# Patient Record
Sex: FEMALE | NOT HISPANIC OR LATINO | URBAN - METROPOLITAN AREA
[De-identification: names, ages, dates, MRNs, and addresses within clinical notes are randomized per-mention and may not be internally consistent; named-entity substitution may affect disease eponyms.]

---

## 2024-04-30 ENCOUNTER — LAB (OUTPATIENT)
Dept: LAB | Facility: LAB | Age: 24
End: 2024-04-30
Payer: COMMERCIAL

## 2024-04-30 ENCOUNTER — OFFICE VISIT (OUTPATIENT)
Dept: OBSTETRICS AND GYNECOLOGY | Facility: CLINIC | Age: 24
End: 2024-04-30
Payer: COMMERCIAL

## 2024-04-30 VITALS
HEART RATE: 98 BPM | WEIGHT: 121 LBS | DIASTOLIC BLOOD PRESSURE: 79 MMHG | BODY MASS INDEX: 23.63 KG/M2 | SYSTOLIC BLOOD PRESSURE: 111 MMHG

## 2024-04-30 DIAGNOSIS — J30.2 SEASONAL ALLERGIES: ICD-10-CM

## 2024-04-30 DIAGNOSIS — J45.909 ASTHMA, UNSPECIFIED ASTHMA SEVERITY, UNSPECIFIED WHETHER COMPLICATED, UNSPECIFIED WHETHER PERSISTENT (HHS-HCC): ICD-10-CM

## 2024-04-30 DIAGNOSIS — Z71.6 ENCOUNTER FOR SMOKING CESSATION COUNSELING: ICD-10-CM

## 2024-04-30 DIAGNOSIS — Z11.3 ROUTINE SCREENING FOR STI (SEXUALLY TRANSMITTED INFECTION): ICD-10-CM

## 2024-04-30 DIAGNOSIS — Z30.09 ENCOUNTER FOR COUNSELING REGARDING CONTRACEPTION: ICD-10-CM

## 2024-04-30 DIAGNOSIS — Z11.3 ROUTINE SCREENING FOR STI (SEXUALLY TRANSMITTED INFECTION): Primary | ICD-10-CM

## 2024-04-30 PROBLEM — R87.612 LOW GRADE SQUAMOUS INTRAEPITHELIAL LESION ON CYTOLOGIC SMEAR OF CERVIX (LGSIL): Status: ACTIVE | Noted: 2024-04-30

## 2024-04-30 LAB
HBV SURFACE AG SERPL QL IA: NONREACTIVE
HCV AB SER QL: NONREACTIVE
HIV 1+2 AB+HIV1 P24 AG SERPL QL IA: NONREACTIVE
TREPONEMA PALLIDUM IGG+IGM AB [PRESENCE] IN SERUM OR PLASMA BY IMMUNOASSAY: NONREACTIVE

## 2024-04-30 PROCEDURE — 99406 BEHAV CHNG SMOKING 3-10 MIN: CPT

## 2024-04-30 PROCEDURE — 86780 TREPONEMA PALLIDUM: CPT

## 2024-04-30 PROCEDURE — 87661 TRICHOMONAS VAGINALIS AMPLIF: CPT

## 2024-04-30 PROCEDURE — 87389 HIV-1 AG W/HIV-1&-2 AB AG IA: CPT

## 2024-04-30 PROCEDURE — 36415 COLL VENOUS BLD VENIPUNCTURE: CPT

## 2024-04-30 PROCEDURE — 4004F PT TOBACCO SCREEN RCVD TLK: CPT

## 2024-04-30 PROCEDURE — 87340 HEPATITIS B SURFACE AG IA: CPT

## 2024-04-30 PROCEDURE — 99213 OFFICE O/P EST LOW 20 MIN: CPT

## 2024-04-30 PROCEDURE — 99406 BEHAV CHNG SMOKING 3-10 MIN: CPT | Mod: GC

## 2024-04-30 PROCEDURE — 99215 OFFICE O/P EST HI 40 MIN: CPT | Mod: GC

## 2024-04-30 PROCEDURE — 87800 DETECT AGNT MULT DNA DIREC: CPT

## 2024-04-30 PROCEDURE — 86803 HEPATITIS C AB TEST: CPT

## 2024-04-30 NOTE — PROGRESS NOTES
Oswaldo Rene is a 23 y.o.  here for visit.    HPI:   Presenting for exam today. No concerns today. Wants Mirena IUD and needs pap, but doesn't have time today. Will schedule procedure only visit next week for both. Wants STI testing today, no concerns.     ObHx:   GynHx:  Menarche/Menopause: 9  Patient's last menstrual period was 2024 (exact date).   Periods are regular every 28-30 days, lasting 5 days.  Current contraception: None, desires Mirena IUD to be placed at next visit.  STIs: none  Last pap 2021, LSIL, due today. Deferred to next visit due to pt child being present.  Social History: uses black n milds and vape, not yet ready to quit. Has quit date as birthday 10/2025.   Exercise: active  Past med hx and past surg hx reviewed and notable for: asthma.    Objective   /79   Pulse 98   Wt 54.9 kg (121 lb)   LMP 2024 (Exact Date)   BMI 23.63 kg/m²      General:   Alert and oriented, in no acute distress   Neck: Supple. No visible thyromegaly.    Breast/Axilla:    Abdomen: Soft, non-tender, without masses or organomegaly   Vulva:    Vagina:    Cervix:    Uterus:    Adnexa:    Pelvic Floor    Psych Normal affect. Normal mood.      Assessment and Plan:    23 y.o. presenting to establish care.  Discussed smoking cessation, STI screening, contraception counseling today.  Patient wants to quit before birthday 10/2025. She declines replacement therapy today she will ween down how much she is using her vape and B&M  Discussed options for contraception- she decided on a Mirena IUD and pap smear when child not present. Will schedule appointment for next week. Will abstain from intercourse until then.  Routine STI screening ordered today.  Needs referral for PCP for refill of epipen/albuterol    Orders Placed This Encounter   Procedures    C. trachomatis + N. gonorrhoeae, Amplified     Standing Status:   Future     Standing Expiration Date:   2025     Order Specific Question:    Release result to MyChart     Answer:   Immediate    Trichomonas vaginalis, Amplified     Standing Status:   Future     Standing Expiration Date:   4/30/2025     Order Specific Question:   Release result to MyChart     Answer:   Immediate    HIV 1/2 Antigen/Antibody Screen with Reflex to Confirmation     Standing Status:   Future     Standing Expiration Date:   4/30/2025     Order Specific Question:   Release result to MyChart     Answer:   Immediate [1]    Hepatitis C Antibody     Standing Status:   Future     Standing Expiration Date:   4/30/2025     Order Specific Question:   Release result to MyChart     Answer:   Immediate [1]    Hepatitis B Surface Antigen     Standing Status:   Future     Standing Expiration Date:   4/30/2025     Order Specific Question:   Release result to MyChart     Answer:   Immediate [1]    Syphilis Screen with Reflex     Standing Status:   Future     Standing Expiration Date:   4/30/2025     Order Specific Question:   Release result to MyChart     Answer:   Immediate [1]    Referral to Primary Care     Standing Status:   Future     Standing Expiration Date:   10/30/2024     Referral Priority:   Routine     Referral Type:   Consultation     Referral Reason:   Specialty Services Required     Requested Specialty:   Family Medicine     Number of Visits Requested:   1      Seen and discussed with Dr. Bailey.   Ramona Stinson MD PGY-1 OB/GYN

## 2024-04-30 NOTE — PATIENT INSTRUCTIONS
Please establish care with Primary care to manage your inhaler and epi pen.  You can buy Zyrtec over the counter for allergies.   See us next week for IUD and pap smear.

## 2024-05-01 LAB
C TRACH RRNA SPEC QL NAA+PROBE: NEGATIVE
N GONORRHOEA DNA SPEC QL PROBE+SIG AMP: NEGATIVE
T VAGINALIS RRNA SPEC QL NAA+PROBE: NEGATIVE

## 2024-10-31 ENCOUNTER — LAB (OUTPATIENT)
Dept: LAB | Facility: LAB | Age: 24
End: 2024-10-31
Payer: COMMERCIAL

## 2024-10-31 ENCOUNTER — INITIAL PRENATAL (OUTPATIENT)
Dept: OBSTETRICS AND GYNECOLOGY | Facility: CLINIC | Age: 24
End: 2024-10-31
Payer: COMMERCIAL

## 2024-10-31 VITALS
WEIGHT: 124.4 LBS | HEART RATE: 65 BPM | HEIGHT: 62 IN | SYSTOLIC BLOOD PRESSURE: 119 MMHG | BODY MASS INDEX: 22.89 KG/M2 | DIASTOLIC BLOOD PRESSURE: 75 MMHG

## 2024-10-31 DIAGNOSIS — J45.909 ASTHMA, UNSPECIFIED ASTHMA SEVERITY, UNSPECIFIED WHETHER COMPLICATED, UNSPECIFIED WHETHER PERSISTENT (HHS-HCC): ICD-10-CM

## 2024-10-31 DIAGNOSIS — Z34.90: ICD-10-CM

## 2024-10-31 DIAGNOSIS — Z87.59 HISTORY OF PRETERM PREMATURE RUPTURE OF MEMBRANES (PPROM): ICD-10-CM

## 2024-10-31 DIAGNOSIS — Z34.81 ENCOUNTER FOR SUPERVISION OF OTHER NORMAL PREGNANCY, FIRST TRIMESTER: ICD-10-CM

## 2024-10-31 DIAGNOSIS — Z32.01 PREGNANCY TEST POSITIVE (HHS-HCC): Primary | ICD-10-CM

## 2024-10-31 DIAGNOSIS — O21.9 NAUSEA AND VOMITING IN PREGNANCY PRIOR TO 22 WEEKS GESTATION: ICD-10-CM

## 2024-10-31 DIAGNOSIS — Z3A.08 8 WEEKS GESTATION OF PREGNANCY (HHS-HCC): ICD-10-CM

## 2024-10-31 PROBLEM — Z30.09 ENCOUNTER FOR COUNSELING REGARDING CONTRACEPTION: Status: RESOLVED | Noted: 2024-04-30 | Resolved: 2024-10-31

## 2024-10-31 PROBLEM — Z71.6 ENCOUNTER FOR SMOKING CESSATION COUNSELING: Status: RESOLVED | Noted: 2024-04-30 | Resolved: 2024-10-31

## 2024-10-31 PROBLEM — Z11.3 ROUTINE SCREENING FOR STI (SEXUALLY TRANSMITTED INFECTION): Status: RESOLVED | Noted: 2024-04-30 | Resolved: 2024-10-31

## 2024-10-31 LAB
ABO GROUP (TYPE) IN BLOOD: NORMAL
ANTIBODY SCREEN: NORMAL
ERYTHROCYTE [DISTWIDTH] IN BLOOD BY AUTOMATED COUNT: 11.9 % (ref 11.5–14.5)
EST. AVERAGE GLUCOSE BLD GHB EST-MCNC: 85 MG/DL
HBA1C MFR BLD: 4.6 %
HBV SURFACE AG SERPL QL IA: NONREACTIVE
HCT VFR BLD AUTO: 40 % (ref 36–46)
HCV AB SER QL: NONREACTIVE
HGB BLD-MCNC: 13.8 G/DL (ref 12–16)
HIV 1+2 AB+HIV1 P24 AG SERPL QL IA: NONREACTIVE
MCH RBC QN AUTO: 31.6 PG (ref 26–34)
MCHC RBC AUTO-ENTMCNC: 34.5 G/DL (ref 32–36)
MCV RBC AUTO: 92 FL (ref 80–100)
NRBC BLD-RTO: 0 /100 WBCS (ref 0–0)
PLATELET # BLD AUTO: 310 X10*3/UL (ref 150–450)
PREGNANCY TEST URINE, POC: POSITIVE
RBC # BLD AUTO: 4.37 X10*6/UL (ref 4–5.2)
REFLEX ADDED, ANEMIA PANEL: NORMAL
RH FACTOR (ANTIGEN D): NORMAL
RUBV IGG SERPL IA-ACNC: 1 IA
RUBV IGG SERPL QL IA: POSITIVE
TREPONEMA PALLIDUM IGG+IGM AB [PRESENCE] IN SERUM OR PLASMA BY IMMUNOASSAY: NONREACTIVE
WBC # BLD AUTO: 11.6 X10*3/UL (ref 4.4–11.3)

## 2024-10-31 PROCEDURE — 85027 COMPLETE CBC AUTOMATED: CPT

## 2024-10-31 PROCEDURE — 83020 HEMOGLOBIN ELECTROPHORESIS: CPT | Performed by: ADVANCED PRACTICE MIDWIFE

## 2024-10-31 PROCEDURE — 86901 BLOOD TYPING SEROLOGIC RH(D): CPT

## 2024-10-31 PROCEDURE — 87389 HIV-1 AG W/HIV-1&-2 AB AG IA: CPT

## 2024-10-31 PROCEDURE — 86317 IMMUNOASSAY INFECTIOUS AGENT: CPT

## 2024-10-31 PROCEDURE — 83036 HEMOGLOBIN GLYCOSYLATED A1C: CPT

## 2024-10-31 PROCEDURE — 87340 HEPATITIS B SURFACE AG IA: CPT

## 2024-10-31 PROCEDURE — 36415 COLL VENOUS BLD VENIPUNCTURE: CPT

## 2024-10-31 PROCEDURE — 87086 URINE CULTURE/COLONY COUNT: CPT | Performed by: ADVANCED PRACTICE MIDWIFE

## 2024-10-31 PROCEDURE — 83021 HEMOGLOBIN CHROMOTOGRAPHY: CPT

## 2024-10-31 PROCEDURE — 87661 TRICHOMONAS VAGINALIS AMPLIF: CPT | Performed by: ADVANCED PRACTICE MIDWIFE

## 2024-10-31 PROCEDURE — 87491 CHLMYD TRACH DNA AMP PROBE: CPT | Performed by: ADVANCED PRACTICE MIDWIFE

## 2024-10-31 PROCEDURE — 81025 URINE PREGNANCY TEST: CPT | Performed by: ADVANCED PRACTICE MIDWIFE

## 2024-10-31 PROCEDURE — 86900 BLOOD TYPING SEROLOGIC ABO: CPT

## 2024-10-31 PROCEDURE — 99215 OFFICE O/P EST HI 40 MIN: CPT | Performed by: ADVANCED PRACTICE MIDWIFE

## 2024-10-31 PROCEDURE — 86780 TREPONEMA PALLIDUM: CPT

## 2024-10-31 PROCEDURE — 86803 HEPATITIS C AB TEST: CPT

## 2024-10-31 PROCEDURE — 86850 RBC ANTIBODY SCREEN: CPT

## 2024-10-31 RX ORDER — DOXYLAMINE SUCCINATE AND PYRIDOXINE HYDROCHLORIDE, DELAYED RELEASE TABLETS 10 MG/10 MG 10; 10 MG/1; MG/1
2 TABLET, DELAYED RELEASE ORAL NIGHTLY
Qty: 60 TABLET | Refills: 1 | Status: SHIPPED | OUTPATIENT
Start: 2024-10-31

## 2024-10-31 SDOH — ECONOMIC STABILITY: FOOD INSECURITY: WITHIN THE PAST 12 MONTHS, THE FOOD YOU BOUGHT JUST DIDN'T LAST AND YOU DIDN'T HAVE MONEY TO GET MORE.: NEVER TRUE

## 2024-10-31 SDOH — ECONOMIC STABILITY: FOOD INSECURITY: WITHIN THE PAST 12 MONTHS, YOU WORRIED THAT YOUR FOOD WOULD RUN OUT BEFORE YOU GOT THE MONEY TO BUY MORE.: NEVER TRUE

## 2024-10-31 SDOH — HEALTH STABILITY: MENTAL HEALTH
DO YOU FEEL STRESS - TENSE, RESTLESS, NERVOUS, OR ANXIOUS, OR UNABLE TO SLEEP AT NIGHT BECAUSE YOUR MIND IS TROUBLED ALL THE TIME - THESE DAYS?: NOT AT ALL

## 2024-10-31 SDOH — ECONOMIC STABILITY: TRANSPORTATION INSECURITY: IN THE PAST 12 MONTHS, HAS LACK OF TRANSPORTATION KEPT YOU FROM MEDICAL APPOINTMENTS OR FROM GETTING MEDICATIONS?: NO

## 2024-10-31 ASSESSMENT — PAIN - FUNCTIONAL ASSESSMENT: PAIN_FUNCTIONAL_ASSESSMENT: 0-10

## 2024-10-31 ASSESSMENT — EDINBURGH POSTNATAL DEPRESSION SCALE (EPDS)
TOTAL SCORE: 6
I HAVE FELT SCARED OR PANICKY FOR NO GOOD REASON: NO, NOT MUCH
I HAVE BEEN ABLE TO LAUGH AND SEE THE FUNNY SIDE OF THINGS: AS MUCH AS I ALWAYS COULD
THINGS HAVE BEEN GETTING ON TOP OF ME: YES, MOST OF THE TIME I HAVEN'T BEEN ABLE TO COPE AT ALL
I HAVE BLAMED MYSELF UNNECESSARILY WHEN THINGS WENT WRONG: NOT VERY OFTEN
I HAVE BEEN SO UNHAPPY THAT I HAVE HAD DIFFICULTY SLEEPING: NOT AT ALL
I HAVE BEEN ANXIOUS OR WORRIED FOR NO GOOD REASON: HARDLY EVER
I HAVE LOOKED FORWARD WITH ENJOYMENT TO THINGS: AS MUCH AS I EVER DID
THE THOUGHT OF HARMING MYSELF HAS OCCURRED TO ME: NEVER
I HAVE FELT SAD OR MISERABLE: NO, NOT AT ALL
I HAVE BEEN SO UNHAPPY THAT I HAVE BEEN CRYING: NO, NEVER

## 2024-11-01 LAB — BACTERIA UR CULT: NO GROWTH

## 2024-11-04 LAB
HEMOGLOBIN A2: 2.4 % (ref 2–3.5)
HEMOGLOBIN A: 97.3 % (ref 95.8–98)
HEMOGLOBIN F: 0.3 % (ref 0–2)
HEMOGLOBIN IDENTIFICATION INTERPRETATION: NORMAL
PATH REVIEW-HGB IDENTIFICATION: NORMAL

## 2024-11-11 ENCOUNTER — HOSPITAL ENCOUNTER (OUTPATIENT)
Dept: RADIOLOGY | Facility: CLINIC | Age: 24
Discharge: HOME | End: 2024-11-11
Payer: COMMERCIAL

## 2024-11-11 DIAGNOSIS — Z34.90: ICD-10-CM

## 2024-11-11 PROCEDURE — 76801 OB US < 14 WKS SINGLE FETUS: CPT

## 2024-11-11 PROCEDURE — 76801 OB US < 14 WKS SINGLE FETUS: CPT | Performed by: OBSTETRICS & GYNECOLOGY

## 2024-11-13 ENCOUNTER — APPOINTMENT (OUTPATIENT)
Dept: RADIOLOGY | Facility: CLINIC | Age: 24
End: 2024-11-13
Payer: COMMERCIAL

## 2024-11-14 LAB
CYTOLOGY CMNT CVX/VAG CYTO-IMP: NORMAL
LAB AP HPV GENOTYPE QUESTION: NO
LAB AP HPV HR: NORMAL
LAB AP PAP ADDITIONAL TESTS: NORMAL
LAB AP PREVIOUS ABNORMAL HISTORY: NORMAL
LABORATORY COMMENT REPORT: NORMAL
LMP START DATE: NORMAL
MENSTRUAL HX REPORTED: NORMAL
PATH REPORT.TOTAL CANCER: NORMAL

## 2024-11-20 PROBLEM — Z3A.11 11 WEEKS GESTATION OF PREGNANCY (HHS-HCC): Status: ACTIVE | Noted: 2024-10-31

## 2024-11-21 ENCOUNTER — INITIAL PRENATAL (OUTPATIENT)
Dept: MATERNAL FETAL MEDICINE | Facility: CLINIC | Age: 24
End: 2024-11-21
Payer: COMMERCIAL

## 2024-11-21 VITALS
HEART RATE: 87 BPM | DIASTOLIC BLOOD PRESSURE: 62 MMHG | BODY MASS INDEX: 24.39 KG/M2 | SYSTOLIC BLOOD PRESSURE: 101 MMHG | WEIGHT: 131.2 LBS

## 2024-11-21 DIAGNOSIS — O21.9 NAUSEA AND VOMITING IN PREGNANCY PRIOR TO 22 WEEKS GESTATION: ICD-10-CM

## 2024-11-21 DIAGNOSIS — Z87.59 HISTORY OF THIRD DEGREE PERINEAL LACERATION: ICD-10-CM

## 2024-11-21 DIAGNOSIS — O99.511 ASTHMA AFFECTING PREGNANCY IN FIRST TRIMESTER (HHS-HCC): ICD-10-CM

## 2024-11-21 DIAGNOSIS — J45.909 ASTHMA AFFECTING PREGNANCY IN FIRST TRIMESTER (HHS-HCC): ICD-10-CM

## 2024-11-21 DIAGNOSIS — Z3A.11 11 WEEKS GESTATION OF PREGNANCY (HHS-HCC): ICD-10-CM

## 2024-11-21 DIAGNOSIS — Z87.59 HISTORY OF PRETERM PREMATURE RUPTURE OF MEMBRANES (PPROM): Primary | ICD-10-CM

## 2024-11-21 PROCEDURE — 99214 OFFICE O/P EST MOD 30 MIN: CPT | Mod: GC | Performed by: OBSTETRICS & GYNECOLOGY

## 2024-11-21 PROCEDURE — 99244 OFF/OP CNSLTJ NEW/EST MOD 40: CPT | Performed by: OBSTETRICS & GYNECOLOGY

## 2024-11-21 ASSESSMENT — ENCOUNTER SYMPTOMS
ALLERGIC/IMMUNOLOGIC NEGATIVE: 0
HEMATOLOGIC/LYMPHATIC NEGATIVE: 0
NEUROLOGICAL NEGATIVE: 0
ENDOCRINE NEGATIVE: 0
CONSTITUTIONAL NEGATIVE: 0
CARDIOVASCULAR NEGATIVE: 0
EYES NEGATIVE: 0
PSYCHIATRIC NEGATIVE: 0
RESPIRATORY NEGATIVE: 0
MUSCULOSKELETAL NEGATIVE: 0
GASTROINTESTINAL NEGATIVE: 0

## 2024-11-21 ASSESSMENT — PATIENT HEALTH QUESTIONNAIRE - PHQ9
SUM OF ALL RESPONSES TO PHQ9 QUESTIONS 1 AND 2: 0
2. FEELING DOWN, DEPRESSED OR HOPELESS: NOT AT ALL
1. LITTLE INTEREST OR PLEASURE IN DOING THINGS: NOT AT ALL

## 2024-11-21 ASSESSMENT — PAIN SCALES - GENERAL: PAINLEVEL_OUTOF10: 0 - NO PAIN

## 2024-11-21 ASSESSMENT — PAIN - FUNCTIONAL ASSESSMENT: PAIN_FUNCTIONAL_ASSESSMENT: 0-10

## 2024-11-21 NOTE — ASSESSMENT & PLAN NOTE
- As you know the patient has a history of pre-term delivery.  Given her report of continuous period-like bleeding throughout her prior pregnancy, we suspect she had an abruption at this time which ultimately led to PPROM and  delivery, however there is no obvious sign of abruption on her prior placental pathology.    - We discussed that often it is unclear the etiology of pre-term delivery which is consistent with her history.  We discussed women with a history of pre-term delivery are at an increased risk of subsequent pre-term delivery of ~25-30%, though there are no good models to predict individual recurrent risk.       - We discussed we would recommend serial cervical length measurement starting at 16-18 weeks to assess for potential cerclage placement and/or vaginal progesterone. Patient is undecided on if she would like to move forward with serial cerivcal lengths. Will order at NT ultrasound, patient encouraged to schedule if she desires to start screening at 16wga.

## 2024-11-21 NOTE — PROGRESS NOTES
"2024   Oswaldo Rene     [facility] CONSULT NOTE  Referring Clinician: Julianne Peters   Reason for consult: PTD     HPI: Oswaldo Rene is a 24 y.o.  at 11w4d here for consult for h/o PTB.     ObHx:  -  39.0 c/b 3b tear. Denies bowel/bladder dysfunction, feels she healed well.   -  delivery at uncertain gestational age, possibly 35wga   - NPNC that pregnancy   - reports she had \"a normal period\" the entire pregnancy with monthly bleeding, subsequently PPROM'd and precipitously delivered   - baby up for adoption    Today she reports nausea this pregnancy, resolves with sleeping. Occasional AM emesis. Able to otherwise tolerate PO.     Patient reports no cramping, bleeding, leaking fluid.     Pregnancy otherwise notable for:  - childhood asthma    Patient Active Problem List   Diagnosis    Asthma affecting pregnancy in first trimester ([facility])    Seasonal allergies    Low grade squamous intraepithelial lesion (LGSIL) on cervicovaginal cytologic smear    11 weeks gestation of pregnancy ([facility])    History of  premature rupture of membranes (PPROM)    Nausea and vomiting in pregnancy prior to 22 weeks gestation     10 point review of system is negative except as above    OB History          3    Para   2    Term   1       1    AB        Living   2         SAB        IAB        Ectopic        Multiple        Live Births   2                   Past medical history: Denies HTN, DM, depression, or thyroid issues    No past surgical history on file.    Medications: PNV, unisom    Allergies   Allergen Reactions    Kiwi Itching and Swelling    Pollen Extracts Itching and Cough       Social History     Tobacco Use    Smoking status: Former     Types: Cigars     Quit date: 2024     Years since quittin.6    Smokeless tobacco: Never   Vaping Use    Vaping status: Never Used   Substance Use Topics    Alcohol use: Not Currently     Alcohol/week: 4.0 standard drinks of alcohol     " Types: 2 Glasses of wine, 2 Standard drinks or equivalent per week    Drug use: Never       family history includes Breast cancer in her paternal grandmother; Cirrhosis in her father; Colon cancer in her maternal grandmother; Diabetes in her mother; Diverticulitis in her maternal grandfather and maternal grandmother; Eczema in her father; Sickle cell trait in her mother.      OBJECTIVE  Visit Vitals  /62   Pulse 87   Wt 59.5 kg (131 lb 3.2 oz)   LMP 2024 (Within Weeks)   BMI 24.39 kg/m²   OB Status Pregnant   Smoking Status Former   BSA 1.61 m²       Physical exam  General: Well appearing, alert  HEENT: normocephalic, EOMI, clear sclera  Cardio: Warm and well perfused  Resp: breathing comfortably on room air  Abd: gravid  Neuro: grossly intact, no focal deficits  Extremities: full ROM, no LE edema  Psych: A&O x3, appropriate mood and affect    ASSESSMENT & PLAN    Oswaldo Rene is a 24 y.o.  at 11w4d here for the following:    Assessment & Plan  History of  premature rupture of membranes (PPROM)  - As you know the patient has a history of pre-term delivery.  Given her report of continuous period-like bleeding throughout her prior pregnancy, we suspect she had an abruption at this time which ultimately led to PPROM and  delivery, however there is no obvious sign of abruption on her prior placental pathology.    - We discussed that often it is unclear the etiology of pre-term delivery which is consistent with her history.  We discussed women with a history of pre-term delivery are at an increased risk of subsequent pre-term delivery of ~25-30%, though there are no good models to predict individual recurrent risk.       - We discussed we would recommend serial cervical length measurement starting at 16-18 weeks to assess for potential cerclage placement and/or vaginal progesterone. Patient is undecided on if she would like to move forward with serial cerivcal lengths. Will order  at NT ultrasound, patient encouraged to schedule if she desires to start screening at 16wga.  Asthma affecting pregnancy in first trimester ([facility])  - discussed asthma can worsen in pregnancy, recommend notifying doctor if she starts experiencing symptoms  11 weeks gestation of pregnancy ([facility])  - NT ultrasound scheduled   Nausea and vomiting in pregnancy prior to 22 weeks gestation  - Able to tolerate PO but still having nasuea/rare emesis with unisom and B6  - rx for zofran sent to pharmacy   History of third degree perineal laceration  - Discussed prior 3b laceration and option for  delivery if she desires. Patient has no concerns about this prior history and strongly desires vaginal delivery   - Reviewed strategies for decreasing risk of perineal laceration prior to and during delivery including warm compresses and perineal massage.    Thank you for allowing us to participate in the care of your patient. We anticipate she should be able to continue her care under your supervision. Please do not hesitate to contact us should any concerns arise.     Patient see and discussed with Dr. Willian Bowser MD , PGY-3  Maternal Fetal Medicine

## 2024-11-21 NOTE — ASSESSMENT & PLAN NOTE
- Discussed prior 3b laceration and option for  delivery if she desires. Patient has no concerns about this prior history and strongly desires vaginal delivery   - Reviewed strategies for decreasing risk of perineal laceration prior to and during delivery including warm compresses and perineal massage.

## 2024-11-21 NOTE — ASSESSMENT & PLAN NOTE
- discussed asthma can worsen in pregnancy, recommend notifying doctor if she starts experiencing symptoms

## 2024-11-21 NOTE — ASSESSMENT & PLAN NOTE
- Able to tolerate PO but still having nasuea/rare emesis with unisom and B6  - rx for zofran sent to pharmacy

## 2024-11-29 ENCOUNTER — ROUTINE PRENATAL (OUTPATIENT)
Dept: OBSTETRICS AND GYNECOLOGY | Facility: CLINIC | Age: 24
End: 2024-11-29
Payer: COMMERCIAL

## 2024-11-29 ENCOUNTER — HOSPITAL ENCOUNTER (OUTPATIENT)
Dept: RADIOLOGY | Facility: CLINIC | Age: 24
Discharge: HOME | End: 2024-11-29
Payer: COMMERCIAL

## 2024-11-29 VITALS — DIASTOLIC BLOOD PRESSURE: 69 MMHG | BODY MASS INDEX: 24.63 KG/M2 | SYSTOLIC BLOOD PRESSURE: 93 MMHG | WEIGHT: 132.5 LBS

## 2024-11-29 DIAGNOSIS — J45.909 ASTHMA AFFECTING PREGNANCY IN FIRST TRIMESTER (HHS-HCC): ICD-10-CM

## 2024-11-29 DIAGNOSIS — Z34.90: ICD-10-CM

## 2024-11-29 DIAGNOSIS — Z87.59 HISTORY OF PRETERM PREMATURE RUPTURE OF MEMBRANES (PPROM): Primary | ICD-10-CM

## 2024-11-29 DIAGNOSIS — O99.511 ASTHMA AFFECTING PREGNANCY IN FIRST TRIMESTER (HHS-HCC): ICD-10-CM

## 2024-11-29 DIAGNOSIS — Z3A.12 12 WEEKS GESTATION OF PREGNANCY (HHS-HCC): ICD-10-CM

## 2024-11-29 PROCEDURE — 99213 OFFICE O/P EST LOW 20 MIN: CPT | Mod: TH | Performed by: OBSTETRICS & GYNECOLOGY

## 2024-11-29 PROCEDURE — 99213 OFFICE O/P EST LOW 20 MIN: CPT | Performed by: OBSTETRICS & GYNECOLOGY

## 2024-11-29 PROCEDURE — 76813 OB US NUCHAL MEAS 1 GEST: CPT

## 2024-11-29 RX ORDER — ASPIRIN 81 MG/1
162 TABLET ORAL DAILY
COMMUNITY

## 2024-11-29 ASSESSMENT — ENCOUNTER SYMPTOMS
NEUROLOGICAL NEGATIVE: 0
RESPIRATORY NEGATIVE: 0
HEMATOLOGIC/LYMPHATIC NEGATIVE: 0
ALLERGIC/IMMUNOLOGIC NEGATIVE: 0
GASTROINTESTINAL NEGATIVE: 0
CARDIOVASCULAR NEGATIVE: 0
PSYCHIATRIC NEGATIVE: 0
MUSCULOSKELETAL NEGATIVE: 0
ENDOCRINE NEGATIVE: 0
CONSTITUTIONAL NEGATIVE: 0
EYES NEGATIVE: 0

## 2024-11-29 NOTE — PROGRESS NOTES
OB Follow-up  2024           ASSESSMENT & PLAN    Oswaldo Rene is a 24 y.o.  at 12w3d here for the following concerns we addressed today:    Assessment & Plan  12 weeks gestation of pregnancy ([facility])  Discussed bASA 162 mg  Has usn today for NT  Orders:    [facility] Prenatal Screen; Future    Spinal Muscular Atrophy Carrier Screening; Future    Cystic Fibrosis Carrier Screening; Future    [facility] Prequel Prenatal Screen     History of  premature rupture of membranes (PPROM)  S/p consult with M, undecided about how wants to proceed (if wants to measure cervical lengths)       Asthma affecting pregnancy in first trimester ([facility])  No symptoms.  No inhaler use for many years.          RTC in 4 weeks    Adele Medina MD     SUBJECTIVE    HPI: Oswaldo Rene is a 24 y.o.  at 12w3d here for RPNV.   Denies bleeding.    States that she is feeling better.      Saw M for h/o PTB @ unknown GA to talk about risk-reduction   Undecided on what she wants to do

## 2024-11-29 NOTE — ASSESSMENT & PLAN NOTE
Discussed Allie Ye mg  Has usn today for NT  Orders:    [facility] Prenatal Screen; Future    Spinal Muscular Atrophy Carrier Screening; Future    Cystic Fibrosis Carrier Screening; Future    [facility] Prenatal Screen

## 2024-11-29 NOTE — ASSESSMENT & PLAN NOTE
S/p consult with MFM, undecided about how wants to proceed (if wants to measure cervical lengths)

## 2024-12-06 ENCOUNTER — LAB (OUTPATIENT)
Dept: LAB | Facility: LAB | Age: 24
End: 2024-12-06
Payer: COMMERCIAL

## 2024-12-06 DIAGNOSIS — Z3A.12 12 WEEKS GESTATION OF PREGNANCY (HHS-HCC): ICD-10-CM

## 2024-12-06 PROCEDURE — 81329 SMN1 GENE DOS/DELETION ALYS: CPT

## 2024-12-06 PROCEDURE — 81220 CFTR GENE COM VARIANTS: CPT

## 2024-12-11 LAB
CFTR MUT ANL BLD/T: NORMAL
ELECTRONICALLY SIGNED BY: NORMAL

## 2024-12-13 LAB
ELECTRONICALLY SIGNED BY: NORMAL
SMA RESULT: NORMAL

## 2024-12-18 LAB
COMMENTS - MP RESULT TYPE: NORMAL
SCAN RESULT: NORMAL

## 2024-12-27 ENCOUNTER — ROUTINE PRENATAL (OUTPATIENT)
Dept: OBSTETRICS AND GYNECOLOGY | Facility: CLINIC | Age: 24
End: 2024-12-27
Payer: COMMERCIAL

## 2024-12-27 ENCOUNTER — HOSPITAL ENCOUNTER (OUTPATIENT)
Dept: RADIOLOGY | Facility: CLINIC | Age: 24
Discharge: HOME | End: 2024-12-27
Payer: COMMERCIAL

## 2024-12-27 DIAGNOSIS — Z34.90: ICD-10-CM

## 2024-12-27 DIAGNOSIS — Z87.59 HISTORY OF PRETERM PREMATURE RUPTURE OF MEMBRANES (PPROM): Primary | ICD-10-CM

## 2024-12-27 DIAGNOSIS — Z3A.16 16 WEEKS GESTATION OF PREGNANCY (HHS-HCC): ICD-10-CM

## 2024-12-27 PROCEDURE — 76815 OB US LIMITED FETUS(S): CPT

## 2024-12-27 PROCEDURE — 76817 TRANSVAGINAL US OBSTETRIC: CPT

## 2025-01-07 ENCOUNTER — HOSPITAL ENCOUNTER (OUTPATIENT)
Dept: RADIOLOGY | Facility: CLINIC | Age: 25
Discharge: HOME | End: 2025-01-07
Payer: COMMERCIAL

## 2025-01-07 DIAGNOSIS — O09.892 HISTORY OF PRETERM DELIVERY, CURRENTLY PREGNANT IN SECOND TRIMESTER (HHS-HCC): ICD-10-CM

## 2025-01-07 DIAGNOSIS — Z34.90: ICD-10-CM

## 2025-01-07 PROCEDURE — 76817 TRANSVAGINAL US OBSTETRIC: CPT | Performed by: OBSTETRICS & GYNECOLOGY

## 2025-01-07 PROCEDURE — 76815 OB US LIMITED FETUS(S): CPT

## 2025-01-07 PROCEDURE — 76815 OB US LIMITED FETUS(S): CPT | Performed by: OBSTETRICS & GYNECOLOGY

## 2025-01-07 PROCEDURE — 76817 TRANSVAGINAL US OBSTETRIC: CPT

## 2025-01-21 ENCOUNTER — HOSPITAL ENCOUNTER (OUTPATIENT)
Dept: RADIOLOGY | Facility: CLINIC | Age: 25
Discharge: HOME | End: 2025-01-21
Payer: COMMERCIAL

## 2025-01-21 DIAGNOSIS — Z34.90: ICD-10-CM

## 2025-01-21 PROCEDURE — 76817 TRANSVAGINAL US OBSTETRIC: CPT | Performed by: OBSTETRICS & GYNECOLOGY

## 2025-01-21 PROCEDURE — 76811 OB US DETAILED SNGL FETUS: CPT | Performed by: OBSTETRICS & GYNECOLOGY

## 2025-01-21 PROCEDURE — 76817 TRANSVAGINAL US OBSTETRIC: CPT

## 2025-01-21 PROCEDURE — 76811 OB US DETAILED SNGL FETUS: CPT

## 2025-02-04 ENCOUNTER — HOSPITAL ENCOUNTER (OUTPATIENT)
Dept: RADIOLOGY | Facility: CLINIC | Age: 25
Discharge: HOME | End: 2025-02-04
Payer: COMMERCIAL

## 2025-02-04 DIAGNOSIS — Z34.90: ICD-10-CM

## 2025-02-04 DIAGNOSIS — O09.892 HISTORY OF PRETERM DELIVERY, CURRENTLY PREGNANT IN SECOND TRIMESTER (HHS-HCC): ICD-10-CM

## 2025-02-04 PROCEDURE — 76817 TRANSVAGINAL US OBSTETRIC: CPT | Performed by: OBSTETRICS & GYNECOLOGY

## 2025-02-04 PROCEDURE — 76815 OB US LIMITED FETUS(S): CPT | Performed by: OBSTETRICS & GYNECOLOGY

## 2025-02-04 PROCEDURE — 76815 OB US LIMITED FETUS(S): CPT

## 2025-02-04 PROCEDURE — 76817 TRANSVAGINAL US OBSTETRIC: CPT

## 2025-03-04 ENCOUNTER — ROUTINE PRENATAL (OUTPATIENT)
Dept: OBSTETRICS AND GYNECOLOGY | Facility: CLINIC | Age: 25
End: 2025-03-04
Payer: COMMERCIAL

## 2025-03-04 VITALS — BODY MASS INDEX: 27.7 KG/M2 | DIASTOLIC BLOOD PRESSURE: 64 MMHG | SYSTOLIC BLOOD PRESSURE: 93 MMHG | WEIGHT: 149 LBS

## 2025-03-04 DIAGNOSIS — O99.512 ASTHMA AFFECTING PREGNANCY IN SECOND TRIMESTER (HHS-HCC): ICD-10-CM

## 2025-03-04 DIAGNOSIS — Z3A.26 26 WEEKS GESTATION OF PREGNANCY (HHS-HCC): ICD-10-CM

## 2025-03-04 DIAGNOSIS — J45.909 ASTHMA AFFECTING PREGNANCY IN SECOND TRIMESTER (HHS-HCC): ICD-10-CM

## 2025-03-04 DIAGNOSIS — O09.92 HIGH-RISK PREGNANCY IN SECOND TRIMESTER (HHS-HCC): Primary | ICD-10-CM

## 2025-03-04 DIAGNOSIS — Z87.59 HISTORY OF PRETERM PREMATURE RUPTURE OF MEMBRANES (PPROM): ICD-10-CM

## 2025-03-04 PROBLEM — O21.9 NAUSEA AND VOMITING IN PREGNANCY PRIOR TO 22 WEEKS GESTATION: Status: RESOLVED | Noted: 2024-11-21 | Resolved: 2025-03-04

## 2025-03-04 PROCEDURE — 99214 OFFICE O/P EST MOD 30 MIN: CPT | Mod: GC,TH | Performed by: STUDENT IN AN ORGANIZED HEALTH CARE EDUCATION/TRAINING PROGRAM

## 2025-03-04 PROCEDURE — 99214 OFFICE O/P EST MOD 30 MIN: CPT | Performed by: STUDENT IN AN ORGANIZED HEALTH CARE EDUCATION/TRAINING PROGRAM

## 2025-03-04 NOTE — ASSESSMENT & PLAN NOTE
- Scant PNC, has not been seen since 12wga  - 2nd tri labs ordered today  - Tdap next visit  - Otherwise UTD on PNC  - BTL consent signed today

## 2025-03-04 NOTE — PROGRESS NOTES
OB Visit  24     SUBJECTIVE    HPI: Oswaldo Rene is a 24 y.o.  at 26w0d here for OB visit. No VB, LOF, ctx. Good FM. Feels well overall. Mood good. No SOB or asthma sx    Pregnancy n/f  - Scant PNC, last seen at 12wga  - Hx PPROM -> PTD at 35wga (). S/p MFM consult and normal CL screenings  - Hx 3rd degree lac in G1, desires vaginal delivery  - Asthma, no inhaler use    OBJECTIVE  Visit Vitals  BP 93/64   Wt 67.6 kg (149 lb)   LMP 2024 (Within Weeks)   BMI 27.70 kg/m²   OB Status Pregnant   Smoking Status Former   BSA 1.71 m²      FHT: 147  Fundal height: 26    ASSESSMENT & PLAN    Oswaldo Rene is a 24 y.o.  at 26w0d here for the following concerns we addressed today:    Problem List Items Addressed This Visit       History of  premature rupture of membranes (PPROM)    Overview      PPROM --> PTB at 35w  Suspect abruption in prior pregnancy given report of continued bleeding throughout pregnancy  MFM consult -> CL screenings through 22w, all wnl         High-risk pregnancy in second trimester ([facility]) - Primary    Relevant Orders    Glucose, 1 Hour Screen, Pregnancy    Syphilis Screen with Reflex    CBC Anemia Panel With Reflex,Pregnancy    Asthma affecting pregnancy in second trimester ([facility])    Overview     Diagnosed age 1  Triggers pollen, bleach, dander  No inhaler use since elementary school         Current Assessment & Plan     - No SOB/symptoms, no inhaler use         26 weeks gestation of pregnancy ([facility])    Overview     Desired provider in labor: [] CNM  [] Physician [x] Either  [x] Blood Products: [x] Yes, accepts [] No, needs counseling  [x] Initial BMI: 24.17   [x] Prenatal Labs: WNL 24   [x] Cervical Cancer Screening up to date collected 10/31/24   [x] Rh status: POS  [x] Genetic Screening:  cfDNA risk-reducing  [x] NT US: (11-13 wks)  WNL  [x] Baby ASA (if indicated): has already started  [x] Pregnancy dated by: 9w6d US    [x]  Anatomy US: (19-20 wks) 1/21, 20w0d, normal anatomy, CL WNL, anterior placenta, no previa  [x] Federal Sterilization consent: signed 3/4  [] 1hr GCT at 24-28wks: ordered 3/4  [] Fetal Surveillance (if indicated):  [] Tdap (27-32 wks, may be given up to 36 wks if initial window missed):   [x] Flu Vaccine: received at work    [] Breastfeeding:  [x] Postpartum Birth control method: BTL, IUD if unable  [] GBS at 36 - 37 wks:  [] 39 weeks discussion of IOL vs. Expectant management:  [] Mode of delivery (anticipated):         Current Assessment & Plan     - Scant PNC, has not been seen since 12wga  - 2nd tri labs ordered today  - Tdap next visit  - Otherwise UTD on PNC  - BTL consent signed today         Relevant Orders    Glucose, 1 Hour Screen, Pregnancy    Syphilis Screen with Reflex    CBC Anemia Panel With Reflex,Pregnancy     RTC in 2 weeks. D/w Dr. Lynn Martin MD  PGY-4, Obstetrics and Gynecology

## 2025-03-14 ENCOUNTER — LAB (OUTPATIENT)
Dept: LAB | Facility: HOSPITAL | Age: 25
End: 2025-03-14
Payer: COMMERCIAL

## 2025-03-14 LAB
ERYTHROCYTE [DISTWIDTH] IN BLOOD BY AUTOMATED COUNT: 13.2 % (ref 11.5–14.5)
HCT VFR BLD AUTO: 37.2 % (ref 36–46)
HGB BLD-MCNC: 12.4 G/DL (ref 12–16)
MCH RBC QN AUTO: 30.3 PG (ref 26–34)
MCHC RBC AUTO-ENTMCNC: 33.3 G/DL (ref 32–36)
MCV RBC AUTO: 91 FL (ref 80–100)
NRBC BLD-RTO: 0 /100 WBCS (ref 0–0)
PLATELET # BLD AUTO: 224 X10*3/UL (ref 150–450)
RBC # BLD AUTO: 4.09 X10*6/UL (ref 4–5.2)
REFLEX ADDED, ANEMIA PANEL: NORMAL
WBC # BLD AUTO: 14.2 X10*3/UL (ref 4.4–11.3)

## 2025-03-14 PROCEDURE — 85027 COMPLETE CBC AUTOMATED: CPT

## 2025-03-15 LAB
GLUCOSE 1H P 50 G GLC PO SERPL-MCNC: 101 MG/DL
T PALLIDUM AB SER QL IA: NORMAL

## 2025-03-18 ENCOUNTER — APPOINTMENT (OUTPATIENT)
Dept: OBSTETRICS AND GYNECOLOGY | Facility: CLINIC | Age: 25
End: 2025-03-18
Payer: COMMERCIAL

## 2025-03-18 VITALS
DIASTOLIC BLOOD PRESSURE: 69 MMHG | HEART RATE: 80 BPM | BODY MASS INDEX: 28.05 KG/M2 | WEIGHT: 150.9 LBS | SYSTOLIC BLOOD PRESSURE: 92 MMHG

## 2025-03-18 DIAGNOSIS — O99.512 ASTHMA AFFECTING PREGNANCY IN SECOND TRIMESTER (HHS-HCC): ICD-10-CM

## 2025-03-18 DIAGNOSIS — Z3A.28 28 WEEKS GESTATION OF PREGNANCY (HHS-HCC): Primary | ICD-10-CM

## 2025-03-18 DIAGNOSIS — J45.909 ASTHMA AFFECTING PREGNANCY IN SECOND TRIMESTER (HHS-HCC): ICD-10-CM

## 2025-03-18 LAB
GLUCOSE 1H P 50 G GLC PO SERPL-MCNC: 101 MG/DL
T PALLIDUM AB SER QL IA: NEGATIVE

## 2025-03-18 PROCEDURE — 99213 OFFICE O/P EST LOW 20 MIN: CPT | Performed by: STUDENT IN AN ORGANIZED HEALTH CARE EDUCATION/TRAINING PROGRAM

## 2025-03-18 PROCEDURE — 99213 OFFICE O/P EST LOW 20 MIN: CPT | Mod: GC,25,TH | Performed by: STUDENT IN AN ORGANIZED HEALTH CARE EDUCATION/TRAINING PROGRAM

## 2025-03-18 PROCEDURE — 90471 IMMUNIZATION ADMIN: CPT | Mod: GC | Performed by: STUDENT IN AN ORGANIZED HEALTH CARE EDUCATION/TRAINING PROGRAM

## 2025-03-18 ASSESSMENT — ENCOUNTER SYMPTOMS
HEMATOLOGIC/LYMPHATIC NEGATIVE: 0
CONSTITUTIONAL NEGATIVE: 0
GASTROINTESTINAL NEGATIVE: 0
ALLERGIC/IMMUNOLOGIC NEGATIVE: 0
PSYCHIATRIC NEGATIVE: 0
ENDOCRINE NEGATIVE: 0
RESPIRATORY NEGATIVE: 0
CARDIOVASCULAR NEGATIVE: 0
MUSCULOSKELETAL NEGATIVE: 0
NEUROLOGICAL NEGATIVE: 0
EYES NEGATIVE: 0

## 2025-03-18 ASSESSMENT — PATIENT HEALTH QUESTIONNAIRE - PHQ9
1. LITTLE INTEREST OR PLEASURE IN DOING THINGS: NOT AT ALL
2. FEELING DOWN, DEPRESSED OR HOPELESS: NOT AT ALL
SUM OF ALL RESPONSES TO PHQ9 QUESTIONS 1 AND 2: 0

## 2025-03-18 ASSESSMENT — PAIN SCALES - GENERAL: PAINLEVEL_OUTOF10: 0 - NO PAIN

## 2025-03-18 ASSESSMENT — PAIN - FUNCTIONAL ASSESSMENT: PAIN_FUNCTIONAL_ASSESSMENT: 0-10

## 2025-03-18 NOTE — PROGRESS NOTES
OB Visit  24     SUBJECTIVE    HPI: Oswaldo Rene is a 24 y.o.  at 28w0d here for OB visit. No VB, LOF, ctx. Good FM. No urinary or GI sx    Pregnancy n/f  - Scant PNC, NPNC between 12w-26w  - Hx PPROM -> PTD at 35wga (). S/p MFM consult and normal CL screenings  - Hx 3rd degree lac in G1, desires vaginal delivery  - Asthma. No recent inhaler use, no SOB     OBJECTIVE  Visit Vitals  BP 92/69   Pulse 80   Wt 68.4 kg (150 lb 14.4 oz)   LMP 2024 (Within Weeks)   BMI 28.05 kg/m²   OB Status Pregnant   Smoking Status Former   BSA 1.72 m²      FHT: 134  Fundal height: 28    ASSESSMENT & PLAN    Oswaldo Rene is a 24 y.o.  at 28w0d here for the following concerns we addressed today:    Problem List Items Addressed This Visit       Asthma affecting pregnancy in second trimester ([facility])    Overview     Diagnosed age 1  Triggers pollen, bleach, dander  No inhaler use since elementary school         28 weeks gestation of pregnancy ([facility]) - Primary    Overview     Desired provider in labor: [] CNM  [] Physician [x] Either  [x] Blood Products: [x] Yes, accepts [] No, needs counseling  [x] Initial BMI: 24.17   [x] Prenatal Labs: WNL 24   [x] Cervical Cancer Screening up to date collected 10/31/24   [x] Rh status: POS  [x] Genetic Screening:  cfDNA risk-reducing  [x] NT US: (11-13 wks)  WNL  [x] Baby ASA (if indicated): has already started  [x] Pregnancy dated by: 9w6d US    [x] Anatomy US: (19-20 wks) , 20w0d, normal anatomy, CL WNL, anterior placenta, no previa  [x] Federal Sterilization consent: signed 3/4  [x] 1hr GCT at 24-28wks: wnl  [] Fetal Surveillance (if indicated):  [x] Tdap (27-32 wks): done 3/18  [x] Flu Vaccine: received at work    [] Breastfeeding:  [x] Postpartum Birth control method: BTL, IUD if unable  [] GBS at 36 - 37 wks:  [] 39 weeks discussion of IOL vs. Expectant management:  [] Mode of delivery (anticipated):         Current Assessment & Plan      - UTD on care  - Tdap today              RTC in 2 weeks. D/w Dr. Lynn Martin MD  PGY-4, Obstetrics and Gynecology

## 2025-03-18 NOTE — PROGRESS NOTES
I reviewed the resident/fellow's documentation and discussed the patient with the resident/fellow. I agree with the resident/fellow's medical decision making as documented in the note.     Corinne A Bazella, MD

## 2025-03-18 NOTE — PROGRESS NOTES
Tdap, vaccine per Dr. Perez  Given IM into right deltoid  Supplied by office  Patient tolerated well  VIS given     LOT #:   Expiration date: 6/14/2027

## 2025-04-01 ENCOUNTER — ROUTINE PRENATAL (OUTPATIENT)
Dept: OBSTETRICS AND GYNECOLOGY | Facility: CLINIC | Age: 25
End: 2025-04-01
Payer: COMMERCIAL

## 2025-04-01 VITALS
SYSTOLIC BLOOD PRESSURE: 97 MMHG | BODY MASS INDEX: 28.81 KG/M2 | WEIGHT: 155 LBS | DIASTOLIC BLOOD PRESSURE: 66 MMHG | HEART RATE: 97 BPM

## 2025-04-01 DIAGNOSIS — O99.512 ASTHMA AFFECTING PREGNANCY IN SECOND TRIMESTER (HHS-HCC): ICD-10-CM

## 2025-04-01 DIAGNOSIS — O09.93 HIGH-RISK PREGNANCY IN THIRD TRIMESTER (HHS-HCC): Primary | ICD-10-CM

## 2025-04-01 DIAGNOSIS — Z3A.30 30 WEEKS GESTATION OF PREGNANCY (HHS-HCC): ICD-10-CM

## 2025-04-01 DIAGNOSIS — J45.909 ASTHMA AFFECTING PREGNANCY IN SECOND TRIMESTER (HHS-HCC): ICD-10-CM

## 2025-04-01 PROCEDURE — 99213 OFFICE O/P EST LOW 20 MIN: CPT | Mod: GC,TH | Performed by: STUDENT IN AN ORGANIZED HEALTH CARE EDUCATION/TRAINING PROGRAM

## 2025-04-01 PROCEDURE — 99213 OFFICE O/P EST LOW 20 MIN: CPT | Performed by: STUDENT IN AN ORGANIZED HEALTH CARE EDUCATION/TRAINING PROGRAM

## 2025-04-01 ASSESSMENT — EDINBURGH POSTNATAL DEPRESSION SCALE (EPDS)
I HAVE LOOKED FORWARD WITH ENJOYMENT TO THINGS: AS MUCH AS I EVER DID
THE THOUGHT OF HARMING MYSELF HAS OCCURRED TO ME: NEVER
I HAVE BEEN ANXIOUS OR WORRIED FOR NO GOOD REASON: NO, NOT AT ALL
I HAVE BEEN SO UNHAPPY THAT I HAVE BEEN CRYING: NO, NEVER
I HAVE BLAMED MYSELF UNNECESSARILY WHEN THINGS WENT WRONG: NO, NEVER
I HAVE FELT SAD OR MISERABLE: NO, NOT AT ALL
I HAVE FELT SCARED OR PANICKY FOR NO GOOD REASON: NO, NOT AT ALL
TOTAL SCORE: 0
I HAVE BEEN SO UNHAPPY THAT I HAVE HAD DIFFICULTY SLEEPING: NOT AT ALL
THINGS HAVE BEEN GETTING ON TOP OF ME: NO, I HAVE BEEN COPING AS WELL AS EVER
I HAVE BEEN ABLE TO LAUGH AND SEE THE FUNNY SIDE OF THINGS: AS MUCH AS I ALWAYS COULD

## 2025-04-01 ASSESSMENT — PATIENT HEALTH QUESTIONNAIRE - PHQ9
SUM OF ALL RESPONSES TO PHQ9 QUESTIONS 1 AND 2: 0
1. LITTLE INTEREST OR PLEASURE IN DOING THINGS: NOT AT ALL
2. FEELING DOWN, DEPRESSED OR HOPELESS: NOT AT ALL

## 2025-04-01 ASSESSMENT — PAIN SCALES - GENERAL: PAINLEVEL_OUTOF10: 0 - NO PAIN

## 2025-04-01 NOTE — PROGRESS NOTES
Ob Visit  24     SUBJECTIVE      HPI: Oswaldo Rene is a 24 y.o.  at 30w0d here for RPNV.  She has no contractions, no bleeding, or no LOF. Reports normal fetal movement. Patient reports no concerns today.       OBJECTIVE  Visit Vitals  BP 97/66   Pulse 97   Wt 70.3 kg (155 lb)   LMP 2024 (Within Weeks)   BMI 28.81 kg/m²   OB Status Pregnant   Smoking Status Former   BSA 1.75 m²            ASSESSMENT & PLAN    Oswaldo Rene is a 24 y.o.  at 30w0d here for the following concerns we addressed today:    Problem List Items Addressed This Visit       Asthma affecting pregnancy in second trimester ([facility])    Overview     Diagnosed age 1  Triggers pollen, bleach, dander  No inhaler use since elementary school         Current Assessment & Plan     Symptoms stable, no concerns         30 weeks gestation of pregnancy ([facility])    Overview     Desired provider in labor: [] CNM  [] Physician [x] Either  [x] Blood Products: [x] Yes, accepts [] No, needs counseling  [x] Initial BMI: 24.17   [x] Prenatal Labs: WNL 24   [x] Cervical Cancer Screening LSIL --> due for pap 2025  [x] Rh status: POS  [x] Genetic Screening:  cfDNA risk-reducing  [x] NT US: (11-13 wks)  WNL  [x] Baby ASA (if indicated): has already started  [x] Pregnancy dated by: 9w6d US    [x] Anatomy US: (19-20 wks) , 20w0d, normal anatomy, CL WNL, anterior placenta, no previa  [x] Federal Sterilization consent: signed 3/4  [x] 1hr GCT at 24-28wks: wnl  [] Fetal Surveillance (if indicated):  [x] Tdap (27-32 wks): done 3/18  [x] Flu Vaccine: received at work    [] Breastfeeding:  [x] Postpartum Birth control method: BTL, consents signed 3/4, IUD if unable  [] GBS at 36 - 37 wks:  [x] 39 weeks discussion of IOL vs. Expectant management: expectant  [x] Mode of delivery (anticipated): vaginal         Current Assessment & Plan     UTD on prenatal care  Discussed ppBC again, strongly desires tubal ligation, reviewed  risk of regret in patients <30, patients expressed understanding  Discussed birth planning and preferences, patient expressed desire for spontaneous labor         High-risk pregnancy in third trimester ([facility]) - Primary         RTC in 2 weeks      Lourdes Becker MD

## 2025-04-01 NOTE — ASSESSMENT & PLAN NOTE
UTD on prenatal care  Discussed ppBC again, strongly desires tubal ligation, reviewed risk of regret in patients <30, patients expressed understanding  Discussed birth planning and preferences, patient expressed desire for spontaneous labor

## 2025-04-15 ENCOUNTER — ROUTINE PRENATAL (OUTPATIENT)
Dept: OBSTETRICS AND GYNECOLOGY | Facility: CLINIC | Age: 25
End: 2025-04-15
Payer: COMMERCIAL

## 2025-04-15 VITALS
WEIGHT: 156.6 LBS | BODY MASS INDEX: 29.11 KG/M2 | SYSTOLIC BLOOD PRESSURE: 98 MMHG | DIASTOLIC BLOOD PRESSURE: 68 MMHG | HEART RATE: 112 BPM

## 2025-04-15 DIAGNOSIS — O09.93 HIGH-RISK PREGNANCY IN THIRD TRIMESTER (HHS-HCC): Primary | ICD-10-CM

## 2025-04-15 DIAGNOSIS — Z3A.32 32 WEEKS GESTATION OF PREGNANCY (HHS-HCC): ICD-10-CM

## 2025-04-15 PROCEDURE — 99213 OFFICE O/P EST LOW 20 MIN: CPT | Performed by: STUDENT IN AN ORGANIZED HEALTH CARE EDUCATION/TRAINING PROGRAM

## 2025-04-15 PROCEDURE — 99213 OFFICE O/P EST LOW 20 MIN: CPT | Mod: GC,TH | Performed by: STUDENT IN AN ORGANIZED HEALTH CARE EDUCATION/TRAINING PROGRAM

## 2025-04-15 NOTE — PROGRESS NOTES
Ob Visit  04/15/25     SUBJECTIVE      HPI: Oswaldo Rene is a 24 y.o.  at 32w0d here for RPNV.  She has no contractions, no bleeding, or no LOF. Reports normal fetal movement.        OBJECTIVE  Visit Vitals  BP 98/68   Pulse (!) 112   Wt 71 kg (156 lb 9.6 oz)   LMP 2024 (Within Weeks)   BMI 29.11 kg/m²   OB Status Pregnant   Smoking Status Former   BSA 1.76 m²            ASSESSMENT & PLAN    Oswaldo Rene is a 24 y.o.  at 32w0d here for the following concerns we addressed today:    Problem List Items Addressed This Visit       32 weeks gestation of pregnancy ([facility])    Overview     Desired provider in labor: [] CNM  [] Physician [x] Either  [x] Blood Products: [x] Yes, accepts [] No, needs counseling  [x] Initial BMI: 24.17   [x] Prenatal Labs: WNL 24   [x] Cervical Cancer Screening LSIL --> due for pap 2025  [x] Rh status: POS  [x] Genetic Screening:  cfDNA risk-reducing  [x] NT US: (11-13 wks)  WNL  [x] Baby ASA (if indicated): has already started  [x] Pregnancy dated by: 9w6d US    [x] Anatomy US: (19-20 wks) , 20w0d, normal anatomy, CL WNL, anterior placenta, no previa  [x] Federal Sterilization consent: signed 3/4  [x] 1hr GCT at 24-28wks: wnl  [x] Fetal Surveillance (if indicated): NA  [x] Tdap (27-32 wks): done 3/18  [x] Flu Vaccine: received at work    [x] Breastfeeding: breast pump Rx given  [x] Postpartum Birth control method: BTL, consents signed 3/4, IUD if unable  [] GBS at 36 - 37 wks:  [x] 39 weeks discussion of IOL vs. Expectant management: expectant  [x] Mode of delivery (anticipated): vaginal         Current Assessment & Plan     Breast pump Rx messaged  Briefly discussed expectant vs IOL          High-risk pregnancy in third trimester ([facility]) - Primary         RTC in 2 weeks    Seen and discussed with Dr. Lynn Becker MD

## 2025-04-29 ENCOUNTER — ROUTINE PRENATAL (OUTPATIENT)
Dept: OBSTETRICS AND GYNECOLOGY | Facility: CLINIC | Age: 25
End: 2025-04-29
Payer: COMMERCIAL

## 2025-04-29 VITALS — DIASTOLIC BLOOD PRESSURE: 74 MMHG | SYSTOLIC BLOOD PRESSURE: 107 MMHG | BODY MASS INDEX: 29.3 KG/M2 | WEIGHT: 157.6 LBS

## 2025-04-29 DIAGNOSIS — J45.909 ASTHMA AFFECTING PREGNANCY IN SECOND TRIMESTER (HHS-HCC): ICD-10-CM

## 2025-04-29 DIAGNOSIS — Z3A.34 34 WEEKS GESTATION OF PREGNANCY (HHS-HCC): Primary | ICD-10-CM

## 2025-04-29 DIAGNOSIS — O99.512 ASTHMA AFFECTING PREGNANCY IN SECOND TRIMESTER (HHS-HCC): ICD-10-CM

## 2025-04-29 PROCEDURE — 99213 OFFICE O/P EST LOW 20 MIN: CPT | Mod: GC,TH

## 2025-04-29 PROCEDURE — 99213 OFFICE O/P EST LOW 20 MIN: CPT

## 2025-04-29 ASSESSMENT — ENCOUNTER SYMPTOMS
PSYCHIATRIC NEGATIVE: 0
MUSCULOSKELETAL NEGATIVE: 0
NEUROLOGICAL NEGATIVE: 0
GASTROINTESTINAL NEGATIVE: 0
LOSS OF SENSATION IN FEET: 0
OCCASIONAL FEELINGS OF UNSTEADINESS: 0
HEMATOLOGIC/LYMPHATIC NEGATIVE: 0
DEPRESSION: 0
CARDIOVASCULAR NEGATIVE: 0
ALLERGIC/IMMUNOLOGIC NEGATIVE: 0
RESPIRATORY NEGATIVE: 0
CONSTITUTIONAL NEGATIVE: 0
ENDOCRINE NEGATIVE: 0
EYES NEGATIVE: 0

## 2025-04-29 ASSESSMENT — COLUMBIA-SUICIDE SEVERITY RATING SCALE - C-SSRS
1. IN THE PAST MONTH, HAVE YOU WISHED YOU WERE DEAD OR WISHED YOU COULD GO TO SLEEP AND NOT WAKE UP?: NO
6. HAVE YOU EVER DONE ANYTHING, STARTED TO DO ANYTHING, OR PREPARED TO DO ANYTHING TO END YOUR LIFE?: NO
2. HAVE YOU ACTUALLY HAD ANY THOUGHTS OF KILLING YOURSELF?: NO

## 2025-04-29 NOTE — PROGRESS NOTES
Ob Follow-up  25     SUBJECTIVE    HPI: Oswaldo Rene is a 24 y.o.  at 34w0d here for RPNV.  She denies contractions, vaginal bleeding, or LOF. Reports normal fetal movement. Patient reports no concerns today. Desires SVE and to see if baby is still breech.        Pregnancy Notables  - H/o PPROM @ 35wga     OBJECTIVE  Visit Vitals  /74   Wt 71.5 kg (157 lb 9.6 oz)   LMP 2024 (Within Weeks)   BMI 29.30 kg/m²   OB Status Pregnant   Smoking Status Former   BSA 1.76 m²          ASSESSMENT & PLAN    Oswaldo Rene is a 24 y.o.  at 34w0d here for the following concerns we addressed today:    34 weeks gestation of pregnancy ([facility])  - UTD on PNC given current gestational age  - For GBS at next visit.   - SVE: closed, cephalic      RTC in 2 weeks with Midwife     D/w LANDON Lai MD  PGY-II, Obstetrics & Gynecology   [facility]

## 2025-05-13 ENCOUNTER — ROUTINE PRENATAL (OUTPATIENT)
Dept: OBSTETRICS AND GYNECOLOGY | Facility: CLINIC | Age: 25
End: 2025-05-13
Payer: COMMERCIAL

## 2025-05-13 VITALS
WEIGHT: 161.6 LBS | BODY MASS INDEX: 30.04 KG/M2 | SYSTOLIC BLOOD PRESSURE: 104 MMHG | DIASTOLIC BLOOD PRESSURE: 71 MMHG | HEART RATE: 97 BPM

## 2025-05-13 DIAGNOSIS — O09.93 HIGH-RISK PREGNANCY IN THIRD TRIMESTER (HHS-HCC): Primary | ICD-10-CM

## 2025-05-13 DIAGNOSIS — J45.909 ASTHMA AFFECTING PREGNANCY IN SECOND TRIMESTER (HHS-HCC): ICD-10-CM

## 2025-05-13 DIAGNOSIS — Z87.59 HISTORY OF THIRD DEGREE PERINEAL LACERATION: ICD-10-CM

## 2025-05-13 DIAGNOSIS — O99.512 ASTHMA AFFECTING PREGNANCY IN SECOND TRIMESTER (HHS-HCC): ICD-10-CM

## 2025-05-13 DIAGNOSIS — Z3A.36 36 WEEKS GESTATION OF PREGNANCY (HHS-HCC): Primary | ICD-10-CM

## 2025-05-13 PROCEDURE — 99213 OFFICE O/P EST LOW 20 MIN: CPT | Mod: GC,TH | Performed by: STUDENT IN AN ORGANIZED HEALTH CARE EDUCATION/TRAINING PROGRAM

## 2025-05-13 PROCEDURE — 99213 OFFICE O/P EST LOW 20 MIN: CPT | Performed by: STUDENT IN AN ORGANIZED HEALTH CARE EDUCATION/TRAINING PROGRAM

## 2025-05-13 ASSESSMENT — ENCOUNTER SYMPTOMS
HEMATOLOGIC/LYMPHATIC NEGATIVE: 0
CARDIOVASCULAR NEGATIVE: 0
CONSTITUTIONAL NEGATIVE: 0
MUSCULOSKELETAL NEGATIVE: 0
ENDOCRINE NEGATIVE: 0
EYES NEGATIVE: 0
NEUROLOGICAL NEGATIVE: 0
GASTROINTESTINAL NEGATIVE: 0
ALLERGIC/IMMUNOLOGIC NEGATIVE: 0
RESPIRATORY NEGATIVE: 0
PSYCHIATRIC NEGATIVE: 0

## 2025-05-13 ASSESSMENT — PAIN SCALES - GENERAL: PAINLEVEL_OUTOF10: 0 - NO PAIN

## 2025-05-13 ASSESSMENT — PAIN - FUNCTIONAL ASSESSMENT: PAIN_FUNCTIONAL_ASSESSMENT: 0-10

## 2025-05-13 NOTE — ASSESSMENT & PLAN NOTE
- GBS collected today  - patient desires IOL at 40wga. Discussed benefits of 39wga IOL and ARRIVE trial data, patient expressed understanding.

## 2025-05-13 NOTE — ASSESSMENT & PLAN NOTE
- In G1 pregnancy, 3629g infant  - No residual deficits per her report  - Desires   - discussed perineal massage prior to delivery, patient amenable

## 2025-05-13 NOTE — PROGRESS NOTES
OB Follow-Up  25     SUBJECTIVE    HPI: Oswaldo Rene is a 24 y.o.  at 36w0d here for RPNV.  She has no contractions, bleeding, or LOF. Reports normal fetal movement.        OBJECTIVE  Visit Vitals  /71   Pulse 97   Wt 73.3 kg (161 lb 9.6 oz)   LMP 2024 (Within Weeks)   BMI 30.04 kg/m²   OB Status Pregnant   Smoking Status Former   BSA 1.78 m²        FHR: 135    ASSESSMENT & PLAN    Oswaldo Rene is a 24 y.o.  at 36w0d here for the following concerns we addressed today:    Asthma affecting pregnancy in second trimester ([facility])  - asx  - No inhaler use since elementary school    History of third degree perineal laceration  - In G1 pregnancy, 3629g infant  - No residual deficits per her report  - Desires   - discussed perineal massage prior to delivery, patient amenable    36 weeks gestation of pregnancy ([facility])  - GBS collected today  - patient desires IOL at 40wga. Discussed benefits of 39wga IOL and ARRIVE trial data, patient expressed understanding.     No orders of the defined types were placed in this encounter.     RTC in 1 weeks    Ramakrishna Peñaloza, PGY4  Seen and discussed with Dr. Bailey

## 2025-05-16 LAB — GP B STREP SPEC QL CULT: NORMAL

## 2025-05-19 PROBLEM — Z3A.37 37 WEEKS GESTATION OF PREGNANCY (HHS-HCC): Status: ACTIVE | Noted: 2024-10-31

## 2025-05-20 ENCOUNTER — ROUTINE PRENATAL (OUTPATIENT)
Dept: OBSTETRICS AND GYNECOLOGY | Facility: CLINIC | Age: 25
End: 2025-05-20
Payer: COMMERCIAL

## 2025-05-20 ENCOUNTER — TELEPHONE (OUTPATIENT)
Dept: OBSTETRICS AND GYNECOLOGY | Facility: CLINIC | Age: 25
End: 2025-05-20

## 2025-05-20 VITALS — WEIGHT: 163 LBS | SYSTOLIC BLOOD PRESSURE: 105 MMHG | BODY MASS INDEX: 30.3 KG/M2 | DIASTOLIC BLOOD PRESSURE: 66 MMHG

## 2025-05-20 DIAGNOSIS — Z34.83 ENCOUNTER FOR SUPERVISION OF OTHER NORMAL PREGNANCY IN THIRD TRIMESTER: ICD-10-CM

## 2025-05-20 DIAGNOSIS — Z3A.37 37 WEEKS GESTATION OF PREGNANCY (HHS-HCC): Primary | ICD-10-CM

## 2025-05-20 PROCEDURE — 99213 OFFICE O/P EST LOW 20 MIN: CPT

## 2025-05-20 PROCEDURE — 99213 OFFICE O/P EST LOW 20 MIN: CPT | Mod: GC,TH

## 2025-05-20 ASSESSMENT — ENCOUNTER SYMPTOMS
RESPIRATORY NEGATIVE: 0
MUSCULOSKELETAL NEGATIVE: 0
CARDIOVASCULAR NEGATIVE: 0
PSYCHIATRIC NEGATIVE: 0
HEMATOLOGIC/LYMPHATIC NEGATIVE: 0
ENDOCRINE NEGATIVE: 0
CONSTITUTIONAL NEGATIVE: 0
NEUROLOGICAL NEGATIVE: 0
GASTROINTESTINAL NEGATIVE: 0
ALLERGIC/IMMUNOLOGIC NEGATIVE: 0
EYES NEGATIVE: 0

## 2025-05-20 NOTE — PROGRESS NOTES
OB Follow-up  25     SUBJECTIVE      HPI: Oswaldo Rene is a 24 y.o.  at 37w0d here for RPNV.  She denies contractions, bleeding, or LOF. Reports normal fetal movement. Patient reports no concerns today.     Pregnancy notables:  - H/o PPROM @ 35wga   - 3rd degree lac in G1  - childhood asthma    OBJECTIVE  Visit Vitals  /66 Comment: pluse-116   Wt 73.9 kg (163 lb)   LMP 2024 (Within Weeks)   BMI 30.30 kg/m²   OB Status Pregnant   Smoking Status Former   BSA 1.79 m²            ASSESSMENT & PLAN    Oswaldo Rene is a 24 y.o.  at 37w0d here for the following concerns we addressed today:    Assessment & Plan  37 weeks gestation of pregnancy ([facility])  - up to date  - GBS neg  - desires tubal or IUD if unable to do tubal  - desires 39wk IOL, will schedule  - planning to breastfeed, will order pump           RTC in 1 week    Seen and discussed with Dr. Lynn Contreras MD, PGY-1

## 2025-05-20 NOTE — ASSESSMENT & PLAN NOTE
- up to date  - GBS neg  - desires tubal or IUD if unable to do tubal  - desires 39wk IOL, will schedule  - planning to breastfeed, will order pump

## 2025-05-20 NOTE — TELEPHONE ENCOUNTER
IOL scheduled 6/3/25 at 2:00 pm 39w0d risk reducing.   Pt notified Visitor policy reviewed Letter sent to My Chart.

## 2025-05-25 ENCOUNTER — ANESTHESIA EVENT (OUTPATIENT)
Dept: OBSTETRICS AND GYNECOLOGY | Facility: HOSPITAL | Age: 25
End: 2025-05-25
Payer: COMMERCIAL

## 2025-05-25 ENCOUNTER — HOSPITAL ENCOUNTER (INPATIENT)
Facility: HOSPITAL | Age: 25
End: 2025-05-25
Attending: OBSTETRICS & GYNECOLOGY | Admitting: OBSTETRICS & GYNECOLOGY
Payer: COMMERCIAL

## 2025-05-25 ENCOUNTER — ANESTHESIA (OUTPATIENT)
Dept: OBSTETRICS AND GYNECOLOGY | Facility: HOSPITAL | Age: 25
End: 2025-05-25
Payer: COMMERCIAL

## 2025-05-25 VITALS
BODY MASS INDEX: 31.34 KG/M2 | HEART RATE: 97 BPM | RESPIRATION RATE: 17 BRPM | SYSTOLIC BLOOD PRESSURE: 92 MMHG | WEIGHT: 166.01 LBS | TEMPERATURE: 98.2 F | OXYGEN SATURATION: 96 % | HEIGHT: 61 IN | DIASTOLIC BLOOD PRESSURE: 58 MMHG

## 2025-05-25 DIAGNOSIS — Z98.890 POST-OPERATIVE STATE: ICD-10-CM

## 2025-05-25 LAB
ERYTHROCYTE [DISTWIDTH] IN BLOOD BY AUTOMATED COUNT: 13.2 % (ref 11.5–14.5)
HCT VFR BLD AUTO: 33.1 % (ref 36–46)
HGB BLD-MCNC: 11.2 G/DL (ref 12–16)
MCH RBC QN AUTO: 30.4 PG (ref 26–34)
MCHC RBC AUTO-ENTMCNC: 33.8 G/DL (ref 32–36)
MCV RBC AUTO: 90 FL (ref 80–100)
NRBC BLD-RTO: 0 /100 WBCS (ref 0–0)
PLATELET # BLD AUTO: 256 X10*3/UL (ref 150–450)
POC APPEARANCE, URINE: CLEAR
POC BILIRUBIN, URINE: NEGATIVE
POC BLOOD, URINE: NEGATIVE
POC COLOR, URINE: YELLOW
POC GLUCOSE, URINE: NEGATIVE MG/DL
POC KETONES, URINE: NEGATIVE MG/DL
POC LEUKOCYTES, URINE: NEGATIVE
POC NITRITE,URINE: NEGATIVE
POC PH, URINE: 7 PH
POC PROTEIN, URINE: ABNORMAL MG/DL
POC SPECIFIC GRAVITY, URINE: 1.02
POC UROBILINOGEN, URINE: 2 EU/DL
RBC # BLD AUTO: 3.69 X10*6/UL (ref 4–5.2)
WBC # BLD AUTO: 13.6 X10*3/UL (ref 4.4–11.3)

## 2025-05-25 PROCEDURE — 7210000002 HC LABOR PER HOUR

## 2025-05-25 PROCEDURE — 59050 FETAL MONITOR W/REPORT: CPT

## 2025-05-25 PROCEDURE — 36415 COLL VENOUS BLD VENIPUNCTURE: CPT

## 2025-05-25 PROCEDURE — 99214 OFFICE O/P EST MOD 30 MIN: CPT

## 2025-05-25 PROCEDURE — 86901 BLOOD TYPING SEROLOGIC RH(D): CPT

## 2025-05-25 PROCEDURE — 86780 TREPONEMA PALLIDUM: CPT

## 2025-05-25 PROCEDURE — 85027 COMPLETE CBC AUTOMATED: CPT

## 2025-05-25 PROCEDURE — 1120000001 HC OB PRIVATE ROOM DAILY

## 2025-05-25 PROCEDURE — 81002 URINALYSIS NONAUTO W/O SCOPE: CPT

## 2025-05-25 RX ORDER — HYDRALAZINE HYDROCHLORIDE 20 MG/ML
5 INJECTION INTRAMUSCULAR; INTRAVENOUS ONCE AS NEEDED
Status: DISCONTINUED | OUTPATIENT
Start: 2025-05-25 | End: 2025-05-26

## 2025-05-25 RX ORDER — METHYLERGONOVINE MALEATE 0.2 MG/ML
0.2 INJECTION INTRAVENOUS ONCE AS NEEDED
Status: DISCONTINUED | OUTPATIENT
Start: 2025-05-25 | End: 2025-05-26

## 2025-05-25 RX ORDER — TERBUTALINE SULFATE 1 MG/ML
0.25 INJECTION SUBCUTANEOUS ONCE AS NEEDED
Status: DISCONTINUED | OUTPATIENT
Start: 2025-05-25 | End: 2025-05-26

## 2025-05-25 RX ORDER — TRANEXAMIC ACID 1 G/10ML
1000 INJECTION, SOLUTION INTRAVENOUS ONCE AS NEEDED
Status: DISCONTINUED | OUTPATIENT
Start: 2025-05-25 | End: 2025-05-26

## 2025-05-25 RX ORDER — ONDANSETRON 4 MG/1
4 TABLET, FILM COATED ORAL EVERY 6 HOURS PRN
Status: DISCONTINUED | OUTPATIENT
Start: 2025-05-25 | End: 2025-05-26

## 2025-05-25 RX ORDER — OXYTOCIN 10 [USP'U]/ML
10 INJECTION, SOLUTION INTRAMUSCULAR; INTRAVENOUS ONCE AS NEEDED
Status: DISCONTINUED | OUTPATIENT
Start: 2025-05-25 | End: 2025-05-26

## 2025-05-25 RX ORDER — ONDANSETRON HYDROCHLORIDE 2 MG/ML
4 INJECTION, SOLUTION INTRAVENOUS EVERY 6 HOURS PRN
Status: DISCONTINUED | OUTPATIENT
Start: 2025-05-25 | End: 2025-05-26

## 2025-05-25 RX ORDER — LABETALOL HYDROCHLORIDE 5 MG/ML
20 INJECTION, SOLUTION INTRAVENOUS ONCE AS NEEDED
Status: DISCONTINUED | OUTPATIENT
Start: 2025-05-25 | End: 2025-05-26

## 2025-05-25 RX ORDER — FENTANYL/ROPIVACAINE/NS/PF 2MCG/ML-.2
0-25 PLASTIC BAG, INJECTION (ML) INJECTION CONTINUOUS
Refills: 0 | Status: DISCONTINUED | OUTPATIENT
Start: 2025-05-26 | End: 2025-05-29 | Stop reason: HOSPADM

## 2025-05-25 RX ORDER — LOPERAMIDE HYDROCHLORIDE 2 MG/1
4 CAPSULE ORAL EVERY 2 HOUR PRN
Status: DISCONTINUED | OUTPATIENT
Start: 2025-05-25 | End: 2025-05-26

## 2025-05-25 RX ORDER — OXYTOCIN/0.9 % SODIUM CHLORIDE 30/500 ML
60 PLASTIC BAG, INJECTION (ML) INTRAVENOUS ONCE AS NEEDED
Status: DISCONTINUED | OUTPATIENT
Start: 2025-05-25 | End: 2025-05-26

## 2025-05-25 RX ORDER — SODIUM CHLORIDE, SODIUM LACTATE, POTASSIUM CHLORIDE, CALCIUM CHLORIDE 600; 310; 30; 20 MG/100ML; MG/100ML; MG/100ML; MG/100ML
75 INJECTION, SOLUTION INTRAVENOUS CONTINUOUS
Status: ACTIVE | OUTPATIENT
Start: 2025-05-25 | End: 2025-05-27

## 2025-05-25 RX ORDER — CARBOPROST TROMETHAMINE 250 UG/ML
250 INJECTION, SOLUTION INTRAMUSCULAR ONCE AS NEEDED
Status: DISCONTINUED | OUTPATIENT
Start: 2025-05-25 | End: 2025-05-26

## 2025-05-25 RX ORDER — CALCIUM CARBONATE 200(500)MG
1 TABLET,CHEWABLE ORAL EVERY 6 HOURS PRN
Status: DISCONTINUED | OUTPATIENT
Start: 2025-05-25 | End: 2025-05-29 | Stop reason: HOSPADM

## 2025-05-25 RX ORDER — OXYTOCIN/0.9 % SODIUM CHLORIDE 30/500 ML
60 PLASTIC BAG, INJECTION (ML) INTRAVENOUS ONCE AS NEEDED
Status: COMPLETED | OUTPATIENT
Start: 2025-05-25 | End: 2025-05-26

## 2025-05-25 RX ORDER — LIDOCAINE HYDROCHLORIDE 10 MG/ML
20 INJECTION, SOLUTION INFILTRATION; PERINEURAL ONCE AS NEEDED
Status: DISCONTINUED | OUTPATIENT
Start: 2025-05-25 | End: 2025-05-26

## 2025-05-25 RX ORDER — MISOPROSTOL 200 UG/1
800 TABLET ORAL ONCE AS NEEDED
Status: DISCONTINUED | OUTPATIENT
Start: 2025-05-25 | End: 2025-05-26

## 2025-05-25 SDOH — ECONOMIC STABILITY: HOUSING INSECURITY: DO YOU FEEL UNSAFE GOING BACK TO THE PLACE WHERE YOU ARE LIVING?: NO

## 2025-05-25 SDOH — SOCIAL STABILITY: SOCIAL INSECURITY
WITHIN THE LAST YEAR, HAVE YOU BEEN RAPED OR FORCED TO HAVE ANY KIND OF SEXUAL ACTIVITY BY YOUR PARTNER OR EX-PARTNER?: NO

## 2025-05-25 SDOH — SOCIAL STABILITY: SOCIAL INSECURITY: WITHIN THE LAST YEAR, HAVE YOU BEEN HUMILIATED OR EMOTIONALLY ABUSED IN OTHER WAYS BY YOUR PARTNER OR EX-PARTNER?: NO

## 2025-05-25 SDOH — SOCIAL STABILITY: SOCIAL INSECURITY
WITHIN THE LAST YEAR, HAVE YOU BEEN KICKED, HIT, SLAPPED, OR OTHERWISE PHYSICALLY HURT BY YOUR PARTNER OR EX-PARTNER?: NO

## 2025-05-25 SDOH — SOCIAL STABILITY: SOCIAL INSECURITY: WITHIN THE LAST YEAR, HAVE YOU BEEN AFRAID OF YOUR PARTNER OR EX-PARTNER?: NO

## 2025-05-25 SDOH — HEALTH STABILITY: MENTAL HEALTH: WISH TO BE DEAD (PAST 1 MONTH): NO

## 2025-05-25 SDOH — HEALTH STABILITY: MENTAL HEALTH: NON-SPECIFIC ACTIVE SUICIDAL THOUGHTS (PAST 1 MONTH): NO

## 2025-05-25 SDOH — ECONOMIC STABILITY: TRANSPORTATION INSECURITY: IN THE PAST 12 MONTHS, HAS LACK OF TRANSPORTATION KEPT YOU FROM MEDICAL APPOINTMENTS OR FROM GETTING MEDICATIONS?: NO

## 2025-05-25 SDOH — HEALTH STABILITY: MENTAL HEALTH: WERE YOU ABLE TO COMPLETE ALL THE BEHAVIORAL HEALTH SCREENINGS?: YES

## 2025-05-25 SDOH — SOCIAL STABILITY: SOCIAL INSECURITY: HAVE YOU HAD ANY THOUGHTS OF HARMING ANYONE ELSE?: NO

## 2025-05-25 SDOH — SOCIAL STABILITY: SOCIAL INSECURITY: PHYSICAL ABUSE: DENIES

## 2025-05-25 SDOH — HEALTH STABILITY: MENTAL HEALTH: SUICIDAL BEHAVIOR (LIFETIME): NO

## 2025-05-25 SDOH — HEALTH STABILITY: MENTAL HEALTH: HAVE YOU USED ANY PRESCRIPTION DRUGS OTHER THAN PRESCRIBED IN THE PAST 12 MONTHS?: NO

## 2025-05-25 SDOH — HEALTH STABILITY: MENTAL HEALTH: HAVE YOU USED ANY SUBSTANCES (CANABIS, COCAINE, HEROIN, HALLUCINOGENS, INHALANTS, ETC.) IN THE PAST 12 MONTHS?: NO

## 2025-05-25 SDOH — ECONOMIC STABILITY: FOOD INSECURITY: WITHIN THE PAST 12 MONTHS, THE FOOD YOU BOUGHT JUST DIDN'T LAST AND YOU DIDN'T HAVE MONEY TO GET MORE.: NEVER TRUE

## 2025-05-25 SDOH — SOCIAL STABILITY: SOCIAL INSECURITY: ABUSE SCREEN: ADULT

## 2025-05-25 SDOH — SOCIAL STABILITY: SOCIAL INSECURITY: VERBAL ABUSE: DENIES

## 2025-05-25 SDOH — ECONOMIC STABILITY: FOOD INSECURITY: HOW HARD IS IT FOR YOU TO PAY FOR THE VERY BASICS LIKE FOOD, HOUSING, MEDICAL CARE, AND HEATING?: NOT VERY HARD

## 2025-05-25 SDOH — SOCIAL STABILITY: SOCIAL INSECURITY: DOES ANYONE TRY TO KEEP YOU FROM HAVING/CONTACTING OTHER FRIENDS OR DOING THINGS OUTSIDE YOUR HOME?: NO

## 2025-05-25 SDOH — ECONOMIC STABILITY: FOOD INSECURITY: WITHIN THE PAST 12 MONTHS, YOU WORRIED THAT YOUR FOOD WOULD RUN OUT BEFORE YOU GOT THE MONEY TO BUY MORE.: NEVER TRUE

## 2025-05-25 SDOH — SOCIAL STABILITY: SOCIAL INSECURITY: ARE YOU OR HAVE YOU BEEN THREATENED OR ABUSED PHYSICALLY, EMOTIONALLY, OR SEXUALLY BY ANYONE?: NO

## 2025-05-25 SDOH — SOCIAL STABILITY: SOCIAL INSECURITY: DO YOU FEEL ANYONE HAS EXPLOITED OR TAKEN ADVANTAGE OF YOU FINANCIALLY OR OF YOUR PERSONAL PROPERTY?: NO

## 2025-05-25 SDOH — SOCIAL STABILITY: SOCIAL INSECURITY: HAS ANYONE EVER THREATENED TO HURT YOUR FAMILY OR YOUR PETS?: NO

## 2025-05-25 SDOH — SOCIAL STABILITY: SOCIAL INSECURITY: HAVE YOU HAD THOUGHTS OF HARMING ANYONE ELSE?: NO

## 2025-05-25 SDOH — SOCIAL STABILITY: SOCIAL INSECURITY: ARE THERE ANY APPARENT SIGNS OF INJURIES/BEHAVIORS THAT COULD BE RELATED TO ABUSE/NEGLECT?: NO

## 2025-05-25 SDOH — HEALTH STABILITY: MENTAL HEALTH: REASON FOR INCOMPLETE PSYCHIATRIC SCREENING: UNABLE TO ASSESS

## 2025-05-25 SDOH — HEALTH STABILITY: MENTAL HEALTH: WERE YOU ABLE TO COMPLETE ALL THE BEHAVIORAL HEALTH SCREENINGS?: NO

## 2025-05-25 ASSESSMENT — LIFESTYLE VARIABLES
AUDIT-C TOTAL SCORE: 0
SKIP TO QUESTIONS 9-10: 1
HOW MANY STANDARD DRINKS CONTAINING ALCOHOL DO YOU HAVE ON A TYPICAL DAY: PATIENT DOES NOT DRINK
AUDIT-C TOTAL SCORE: 0
HOW OFTEN DO YOU HAVE 6 OR MORE DRINKS ON ONE OCCASION: NEVER
HOW OFTEN DO YOU HAVE A DRINK CONTAINING ALCOHOL: NEVER

## 2025-05-25 ASSESSMENT — PATIENT HEALTH QUESTIONNAIRE - PHQ9
2. FEELING DOWN, DEPRESSED OR HOPELESS: NOT AT ALL
SUM OF ALL RESPONSES TO PHQ9 QUESTIONS 1 & 2: 0
1. LITTLE INTEREST OR PLEASURE IN DOING THINGS: NOT AT ALL

## 2025-05-25 ASSESSMENT — ACTIVITIES OF DAILY LIVING (ADL): LACK_OF_TRANSPORTATION: NO

## 2025-05-26 ENCOUNTER — ANESTHESIA (OUTPATIENT)
Dept: OBSTETRICS AND GYNECOLOGY | Facility: HOSPITAL | Age: 25
End: 2025-05-26
Payer: COMMERCIAL

## 2025-05-26 ENCOUNTER — ANESTHESIA EVENT (OUTPATIENT)
Dept: OBSTETRICS AND GYNECOLOGY | Facility: HOSPITAL | Age: 25
End: 2025-05-26
Payer: COMMERCIAL

## 2025-05-26 LAB
ABO GROUP (TYPE) IN BLOOD: NORMAL
ANTIBODY SCREEN: NORMAL
RH FACTOR (ANTIGEN D): NORMAL
TREPONEMA PALLIDUM IGG+IGM AB [PRESENCE] IN SERUM OR PLASMA BY IMMUNOASSAY: NONREACTIVE

## 2025-05-26 PROCEDURE — 59409 OBSTETRICAL CARE: CPT

## 2025-05-26 PROCEDURE — 1210000001 HC SEMI-PRIVATE ROOM DAILY

## 2025-05-26 PROCEDURE — 3700000014 EPIDURAL BLOCK: Mod: GC

## 2025-05-26 PROCEDURE — 7210000002 HC LABOR PER HOUR

## 2025-05-26 PROCEDURE — 59409 OBSTETRICAL CARE: CPT | Performed by: SPECIALIST

## 2025-05-26 PROCEDURE — 01967 NEURAXL LBR ANES VAG DLVR: CPT | Performed by: STUDENT IN AN ORGANIZED HEALTH CARE EDUCATION/TRAINING PROGRAM

## 2025-05-26 PROCEDURE — 7100000016 HC LABOR RECOVERY PER HOUR

## 2025-05-26 PROCEDURE — 2500000001 HC RX 250 WO HCPCS SELF ADMINISTERED DRUGS (ALT 637 FOR MEDICARE OP): Mod: SE | Performed by: STUDENT IN AN ORGANIZED HEALTH CARE EDUCATION/TRAINING PROGRAM

## 2025-05-26 PROCEDURE — 2500000004 HC RX 250 GENERAL PHARMACY W/ HCPCS (ALT 636 FOR OP/ED): Mod: JW,SE

## 2025-05-26 PROCEDURE — 2720000007 HC OR 272 NO HCPCS

## 2025-05-26 PROCEDURE — 2500000004 HC RX 250 GENERAL PHARMACY W/ HCPCS (ALT 636 FOR OP/ED): Mod: JZ,SE

## 2025-05-26 PROCEDURE — 2500000004 HC RX 250 GENERAL PHARMACY W/ HCPCS (ALT 636 FOR OP/ED): Mod: JZ,SE | Performed by: STUDENT IN AN ORGANIZED HEALTH CARE EDUCATION/TRAINING PROGRAM

## 2025-05-26 RX ORDER — POLYETHYLENE GLYCOL 3350 17 G/17G
17 POWDER, FOR SOLUTION ORAL 2 TIMES DAILY PRN
Status: DISCONTINUED | OUTPATIENT
Start: 2025-05-26 | End: 2025-05-29 | Stop reason: HOSPADM

## 2025-05-26 RX ORDER — ACETAMINOPHEN 325 MG/1
975 TABLET ORAL EVERY 6 HOURS
Status: DISCONTINUED | OUTPATIENT
Start: 2025-05-26 | End: 2025-05-29 | Stop reason: HOSPADM

## 2025-05-26 RX ORDER — DIPHENHYDRAMINE HYDROCHLORIDE 50 MG/ML
25 INJECTION, SOLUTION INTRAMUSCULAR; INTRAVENOUS EVERY 6 HOURS PRN
Status: DISCONTINUED | OUTPATIENT
Start: 2025-05-26 | End: 2025-05-29 | Stop reason: HOSPADM

## 2025-05-26 RX ORDER — OXYTOCIN/0.9 % SODIUM CHLORIDE 30/500 ML
60 PLASTIC BAG, INJECTION (ML) INTRAVENOUS ONCE AS NEEDED
Status: DISCONTINUED | OUTPATIENT
Start: 2025-05-26 | End: 2025-05-29 | Stop reason: HOSPADM

## 2025-05-26 RX ORDER — METHYLERGONOVINE MALEATE 0.2 MG/ML
0.2 INJECTION INTRAVENOUS ONCE AS NEEDED
Status: DISCONTINUED | OUTPATIENT
Start: 2025-05-26 | End: 2025-05-29 | Stop reason: HOSPADM

## 2025-05-26 RX ORDER — ONDANSETRON 4 MG/1
4 TABLET, FILM COATED ORAL EVERY 6 HOURS PRN
Status: DISCONTINUED | OUTPATIENT
Start: 2025-05-26 | End: 2025-05-29 | Stop reason: HOSPADM

## 2025-05-26 RX ORDER — CARBOPROST TROMETHAMINE 250 UG/ML
250 INJECTION, SOLUTION INTRAMUSCULAR ONCE AS NEEDED
Status: DISCONTINUED | OUTPATIENT
Start: 2025-05-26 | End: 2025-05-29 | Stop reason: HOSPADM

## 2025-05-26 RX ORDER — IBUPROFEN 600 MG/1
600 TABLET, FILM COATED ORAL EVERY 6 HOURS
Status: DISCONTINUED | OUTPATIENT
Start: 2025-05-26 | End: 2025-05-29 | Stop reason: HOSPADM

## 2025-05-26 RX ORDER — LIDOCAINE HCL/EPINEPHRINE/PF 2%-1:200K
VIAL (ML) INJECTION AS NEEDED
Status: DISCONTINUED | OUTPATIENT
Start: 2025-05-26 | End: 2025-05-26

## 2025-05-26 RX ORDER — SIMETHICONE 80 MG
80 TABLET,CHEWABLE ORAL 4 TIMES DAILY PRN
Status: DISCONTINUED | OUTPATIENT
Start: 2025-05-26 | End: 2025-05-29 | Stop reason: HOSPADM

## 2025-05-26 RX ORDER — LABETALOL HYDROCHLORIDE 5 MG/ML
20 INJECTION, SOLUTION INTRAVENOUS ONCE AS NEEDED
Status: DISCONTINUED | OUTPATIENT
Start: 2025-05-26 | End: 2025-05-29 | Stop reason: HOSPADM

## 2025-05-26 RX ORDER — OXYTOCIN/0.9 % SODIUM CHLORIDE 30/500 ML
2-30 PLASTIC BAG, INJECTION (ML) INTRAVENOUS CONTINUOUS
Status: DISCONTINUED | OUTPATIENT
Start: 2025-05-26 | End: 2025-05-26

## 2025-05-26 RX ORDER — DIPHENHYDRAMINE HCL 25 MG
25 CAPSULE ORAL EVERY 6 HOURS PRN
Status: DISCONTINUED | OUTPATIENT
Start: 2025-05-26 | End: 2025-05-29 | Stop reason: HOSPADM

## 2025-05-26 RX ORDER — LOPERAMIDE HYDROCHLORIDE 2 MG/1
4 CAPSULE ORAL EVERY 2 HOUR PRN
Status: DISCONTINUED | OUTPATIENT
Start: 2025-05-26 | End: 2025-05-29 | Stop reason: HOSPADM

## 2025-05-26 RX ORDER — OXYTOCIN 10 [USP'U]/ML
10 INJECTION, SOLUTION INTRAMUSCULAR; INTRAVENOUS ONCE AS NEEDED
Status: DISCONTINUED | OUTPATIENT
Start: 2025-05-26 | End: 2025-05-29 | Stop reason: HOSPADM

## 2025-05-26 RX ORDER — ONDANSETRON HYDROCHLORIDE 2 MG/ML
4 INJECTION, SOLUTION INTRAVENOUS EVERY 6 HOURS PRN
Status: DISCONTINUED | OUTPATIENT
Start: 2025-05-26 | End: 2025-05-29 | Stop reason: HOSPADM

## 2025-05-26 RX ORDER — TRANEXAMIC ACID 1 G/10ML
1000 INJECTION, SOLUTION INTRAVENOUS ONCE AS NEEDED
Status: DISCONTINUED | OUTPATIENT
Start: 2025-05-26 | End: 2025-05-28

## 2025-05-26 RX ORDER — HYDRALAZINE HYDROCHLORIDE 20 MG/ML
5 INJECTION INTRAMUSCULAR; INTRAVENOUS ONCE AS NEEDED
Status: DISCONTINUED | OUTPATIENT
Start: 2025-05-26 | End: 2025-05-29 | Stop reason: HOSPADM

## 2025-05-26 RX ORDER — MISOPROSTOL 200 UG/1
800 TABLET ORAL ONCE AS NEEDED
Status: DISCONTINUED | OUTPATIENT
Start: 2025-05-26 | End: 2025-05-29 | Stop reason: HOSPADM

## 2025-05-26 RX ORDER — ADHESIVE BANDAGE
10 BANDAGE TOPICAL
Status: DISCONTINUED | OUTPATIENT
Start: 2025-05-26 | End: 2025-05-29 | Stop reason: HOSPADM

## 2025-05-26 RX ADMIN — Medication 5 ML: at 06:46

## 2025-05-26 RX ADMIN — LIDOCAINE HYDROCHLORIDE,EPINEPHRINE BITARTRATE 3 ML: 20; .005 INJECTION, SOLUTION EPIDURAL; INFILTRATION; INTRACAUDAL; PERINEURAL at 06:44

## 2025-05-26 RX ADMIN — Medication 10 ML/HR: at 06:47

## 2025-05-26 RX ADMIN — ACETAMINOPHEN 975 MG: 325 TABLET ORAL at 22:12

## 2025-05-26 RX ADMIN — Medication 2 MILLI-UNITS/MIN: at 04:28

## 2025-05-26 RX ADMIN — SODIUM CHLORIDE, POTASSIUM CHLORIDE, SODIUM LACTATE AND CALCIUM CHLORIDE 500 ML: 600; 310; 30; 20 INJECTION, SOLUTION INTRAVENOUS at 06:16

## 2025-05-26 RX ADMIN — ACETAMINOPHEN 975 MG: 325 TABLET ORAL at 16:21

## 2025-05-26 RX ADMIN — IBUPROFEN 600 MG: 600 TABLET ORAL at 22:12

## 2025-05-26 RX ADMIN — ACETAMINOPHEN 975 MG: 325 TABLET ORAL at 10:24

## 2025-05-26 RX ADMIN — IBUPROFEN 600 MG: 600 TABLET ORAL at 16:21

## 2025-05-26 RX ADMIN — IBUPROFEN 600 MG: 600 TABLET ORAL at 10:24

## 2025-05-26 RX ADMIN — Medication 60 MILLI-UNITS/MIN: at 08:29

## 2025-05-26 RX ADMIN — SODIUM CHLORIDE, POTASSIUM CHLORIDE, SODIUM LACTATE AND CALCIUM CHLORIDE 75 ML/HR: 600; 310; 30; 20 INJECTION, SOLUTION INTRAVENOUS at 03:41

## 2025-05-26 ASSESSMENT — PAIN SCALES - GENERAL
PAINLEVEL_OUTOF10: 6
PAINLEVEL_OUTOF10: 0 - NO PAIN
PAINLEVEL_OUTOF10: 0 - NO PAIN
PAINLEVEL_OUTOF10: 4
PAINLEVEL_OUTOF10: 0 - NO PAIN
PAINLEVEL_OUTOF10: 0 - NO PAIN
PAINLEVEL_OUTOF10: 6
PAINLEVEL_OUTOF10: 0 - NO PAIN
PAINLEVEL_OUTOF10: 3
PAINLEVEL_OUTOF10: 0 - NO PAIN

## 2025-05-26 ASSESSMENT — PAIN DESCRIPTION - DESCRIPTORS
DESCRIPTORS: DISCOMFORT
DESCRIPTORS: CRAMPING

## 2025-05-26 NOTE — L&D DELIVERY NOTE
Vaginal Delivery Note    Patient Name: Oswaldo Rene  : 2000  MRN: [id number]  Age: 24 y.o.    /Para:   Gestational Age: 37w6d    Date of Delivery: 2025    Procedure: . Patient pushing with good effort. Head delivered, restituted to CLAIR, with body delivered subsequently thereafter. Postpartum pitocin bolus initiated immediately after birth. Infant placed on maternal abdomen for skin-to-skin. Cord clamped x2 and cut, a blood sample was collected. Placenta delivered spontaneously, with gentle downward traction. Vagina, perineum, and labia inspected. Bilateral periurethral lacerations noted, no repair required, hemostatic. . Fundus firm on fundal massage. Mother and baby stable.       Findings:   Amniotic fluid Clear, Female infant in Vertex Occiput Anterior presentation, APGARS 9 , 9 .  Birth Weight 2.88 kg.    Complications: None    Quantitative Blood Loss:   Delivery Blood Loss   Intrapartum & Postpartum: 25 - 2534    Delivery Admission: 25 - 25 0834         Intrapartum & Postpartum Delivery Admission    None               Blood products:      Uterotonics/Hemostatic Agent: IV Pitocin 30 units    Specimen:   Placenta  Delivered: 2025  8:33 AM  Appearance: Intact  Removal: Spontaneous    Disposition: discarded    Sponge/Instrument/Needle Counts: The sponge, lap and needle counts were correct.    Patient Disposition: Patient recovering on labor and delivery in stable condition.    Additional Procedures:  None    Oswaldo ReneAnil [[id number]]      Labor Events    Sac identifier: Sac 1  Rupture date/time: 2025 1930  Rupture type: Spontaneous  Fluid color: Clear  Fluid odor: None  Labor type: Spontaneous Onset of Labor  Labor allowed to proceed with plans for an attempted vaginal birth?: Yes  Augmentation: Oxytocin  Augmentation indications: Ineffective Contraction Pattern  Complications: None       Labor Event Times    Labor onset  date/time: 2025  Dilation complete date/time: 2025 0811  Start pushing date/time: 2025 08:15       Placenta    Placenta delivery date/time: 2025 08:33  Placenta removal: Spontaneous  Placenta appearance: Intact  Placenta disposition: discarded       Cord    Vessels: 3 vessels  Complications: None  Delayed cord clamping?: Yes  Cord blood disposition: Discarded  Gases sent?: No  Stem cell collection (by provider): No       Lacerations    Episiotomy: None  Perineal laceration: None  Periurethral laceration?: Yes  Periurethral laceration location: bilateral  Periurethral laceration repaired?: No  Repair suture: None       Anesthesia    Method: Epidural       Operative Delivery    Forceps attempted?: No  Vacuum extractor attempted?: No       Shoulder Dystocia    Shoulder dystocia present?: No       Girard Delivery    Birth date/time: 2025 08:20:00  Delivery type: Vaginal, Spontaneous  Complications: None       Resuscitation    Method: Suctioning, Tactile stimulation       Apgars    Living status: Living  Apgar Component Scores:  1 min.:  5 min.:  10 min.:  15 min.:  20 min.:    Skin color:  1  1       Heart rate:  2  2       Reflex irritability:  2  2       Muscle tone:  2  2       Respiratory effort:  2  2       Total:  9  9       Apgars assigned by: BRAXTON SULLIVAN       Delivery Providers    Delivering clinician: Cande Smallwood MD   Provider Role    Melissa Gomez RN Delivery Nurse    Sandra Sullivan, RN Nursery Nurse    Ramona Hinton MD Resident    Mikey Reed DO Resident                Dr. Smallwood present for entirety of delivery.     Mikey Reed DO  Emergency Medicine PGY-1

## 2025-05-26 NOTE — DISCHARGE INSTRUCTIONS

## 2025-05-26 NOTE — H&P
OB Triage H&P    Assessment/Plan    Oswaldo Rene is a 24 y.o.  at 37w5d by 9wk US who presents to triage with SROM and likely in labor.     Plan  SROM/Labor   -1930 spontaneous rupture   -Nitrazine positive, pooling on exam after valsalva maneuver, difficult to assess for ferning due to low amount of sample on slide   -3cm on cervical exam, likely labor, however need additional exam to assess further   -Discussed with patient option to monitor for cervical change without labor augmentation vs. Augmentation with pitocin. Will plan to reassess in a few hours.   -Admit to L&D, consented  -T&S, CBC, and Syphilis  -Epidural at patient request  -Recheck as clinically indicated by maternal or fetal status    Fetal Status  -CEFM, Category 2 due to one shallow variable decel and overall reassuring with acceleration and moderate variability  -Presentation cephalic based on ultrasound  -EFW 7.5lbs by Leopold's   -GBS neg    Postpartum: desires ppTL, consent signed 3/    Discussed plan and reviewed with: Dr. Fawn Taylor MD  PGY1, Obstetrics and Gynecology    Subjective   24 y.o.  at 37w5d by 9wk US who presents to triage with leakage of fluid and contractions.  Woke up this morning feeling mild contractions. Around 3pm took a nap and woke up and noticed constant fluid coming from vagina. Reports this occurred around 7:30pm. Unsure if it broke while sleeping. Has been feeling constant contractions since then. Contractions are getting closer together and more intense. Q15-20 before not every 5-10min.  Good fetal movement.  Denies vaginal bleeding.    Pregnancy Notables:   -hx PPROM and PTD @35wga, baby was adopted (not to be discussed with family)  -asthma, no recent inhaler use     Obhx: no complications   Gynhx: denies STI   PHM: asthma, no recent inhaler use   PSH: none   Meds: PNV  Allergies: none  Social: none    Prenatal Provider -[place]     OB History    Para Term  AB  Living   3 2 1 1 0 2   SAB IAB Ectopic Multiple Live Births   0 0 0 0 2      # Outcome Date GA Lbr Marcus/2nd Weight Sex Type Anes PTL Lv   3 Current            2  22 35w0d  2.438 kg M Vag-Spont None Y RIVERA      Name: Mat Busby Term 18 39w0d  3.629 kg M Vag-Spont EPI N RIVERA      Name: Aden Negrete       Surgical History[1]    Social History     Tobacco Use    Smoking status: Former     Types: Cigars     Quit date: 2024     Years since quittin.1    Smokeless tobacco: Never   Substance Use Topics    Alcohol use: Not Currently     Alcohol/week: 4.0 standard drinks of alcohol     Types: 2 Glasses of wine, 2 Standard drinks or equivalent per week       Allergies[2]    Prescriptions Prior to Admission[3]  Objective     Last Vitals  Temp Pulse Resp BP MAP O2 Sat   36.8 °C (98.2 °F) 97 17 92/58 70 96 %     Blood Pressures         2025  2030 2025  2139          BP: 99/62 92/58               Physical Exam  General: NAD, mood appropriate  Cardiopulmonary: warm and well perfused, breathing comfortably on room air  Abdomen: Gravid, non-tender  Extremities: Symmetric  Speculum Exam: Pooling noted after valsalva. Normal vaginal epithelium, normal-appearing cervix. No lesions or masses appreciated on cervix. No lesions or masses noted along the vaginal epithelium. Normal vaginal discharge, no odor.   Cervix: 3 /50 /-2     Chaperone Present: Yes.  Chaperone Name/Title: Bedside RN  Examination Chaperoned: Gynecological Exam     Fetal Monitoring  Baseline: 135-140 bpm, Variability: moderate,  Accelerations: present and 1x variable decel  Uterine Activity: q2-5 minutes  Interpretation: Category 2 due to one shallow variable decel and overall reassuring with acceleration and moderate variability     Bedside ultrasound: Yes    Labs in chart were reviewed.          Prenatal labs reviewed, not remarkable.             [1] No past surgical history on file.  [2]   Allergies  Allergen Reactions    Kiwi  Itching and Swelling    Pollen Extracts Itching and Cough   [3]   Medications Prior to Admission   Medication Sig Dispense Refill Last Dose/Taking    aspirin 81 mg EC tablet Take 2 tablets (162 mg) by mouth once daily.   5/25/2025    prenatal no118-iron-folic acid 29 mg iron- 1 mg tablet,chewable Chew 1 tablet once daily. 90 tablet 3 Past Week

## 2025-05-26 NOTE — SIGNIFICANT EVENT
S: Patient resting comfortably. Feel contractions are spacing out.     SVE:  4/60/-2  FHT: 130/mod/+accel/-decel  [place]: irregular contractions     A/P:  -latent labor  -discussed with patient given spacing of contractions recommend augmentation with pitocin, patient agreeable   -encouraged position changes   -epidural at patient request     Discussed with Dr. Russ Taylor MD  PGY1, Obstetrics and Gynecology

## 2025-05-26 NOTE — ANESTHESIA PROCEDURE NOTES
Epidural Block    Patient location during procedure: OB  Start time: 5/26/2025 6:25 AM  End time: 5/26/2025 6:45 AM  Reason for block: labor analgesia  Staffing  Performed: resident   Authorized by: Tara Du MD    Performed by: Jodi Bourne MD    Preanesthetic Checklist  Completed: patient identified, IV checked, risks and benefits discussed, surgical consent, pre-op evaluation, timeout performed and sterile techniques followed  Block Timeout  RN/Licensed healthcare professional reads aloud to the Anesthesia provider and entire team: Patient identity, procedure with side and site, patient position, and as applicable the availability of implants/special equipment/special requirements.    Timeout performed at: 5/26/2025 6:25 AM  Block Placement  Patient position: sitting  Prep: ChloraPrep  Sterility prep: cap, drape, gloves and mask  Sedation level: no sedation  Patient monitoring: blood pressure, continuous pulse oximetry and heart rate  Approach: midline  Local numbing: lidocaine 1% to skin and subcutaneous tissues  Vertebral space: lumbar  Lumbar location: L4-L5  Epidural  Loss of resistance technique: saline  Guidance: landmark technique        Needle  Needle type: Tuohy   Needle gauge: 17  Needle length: 8.9cm  Needle insertion depth: 6 cm  Catheter type: multi-orifice  Catheter size: 19 G  Catheter at skin depth: 11 cm  Catheter securement method: clear occlusive dressing and liquid medical adhesive    Test dose: lidocaine 1.5% with epinephrine 1-to-200,000  Test dose: lidocaine 1.5% with epinephrine 1-to-200,000  Test dose result: no positive test dose    PCEA  Medication concentration used: 0.2% Ropivacaine with 2 mcg/mL Fentanyl  Dose (mL): 5  Lockout (minutes): 30  1-Hour Limit (boluses/hr): 2  Basal Rate: 10        Assessment  Sensory level: T6 bilateral  Block outcome: pain improved  Number of attempts: 1  Events: no positive test dose  Procedure assessment: patient tolerated procedure well with no  immediate complications

## 2025-05-26 NOTE — ANESTHESIA PREPROCEDURE EVALUATION
Patient: Oswaldo Rene    Evaluation Method: In-person visit    Procedure Information    Date: 25  Procedure: Labor Consult     24yF  @ 37w5d admitted for SROM. Hx of PPROM, prior 3rd degree lac in G1, childhood asthma (no inhaler use or exacerbations since childhood),     Relevant Problems   Pulmonary   (+) Asthma affecting pregnancy in second trimester ([facility])      HEENT   (+) Seasonal allergies      GYN   (+) 37 weeks gestation of pregnancy ([facility])   (+) High-risk pregnancy in third trimester ([facility])       Clinical information reviewed:    Allergies  Meds               NPO Detail:  NPO/Void Status  Date of Last Liquid: 25  Time of Last Liquid:   Date of Last Solid: 25  Time of Last Solid:          OB/Gyn Evaluation    Present Pregnancy    Patient is pregnant now.  (-) anticoagulation use during pregnancy     Obstetric History      (-) difficult neuraxial anesthesia                Physical Exam    Airway  Mallampati: II  TM distance: >3 FB  Neck ROM: full  Mouth opening: 3 or more finger widths     Cardiovascular   Rhythm: regular  Rate: normal     Dental - normal exam     Pulmonary - normal exam   Abdominal          Results for orders placed or performed during the hospital encounter of 25 (from the past 24 hours)   POCT UA (nonautomated) manually resulted   Result Value Ref Range    POC Color, Urine Yellow Straw, Yellow, Light-Yellow    POC Appearance, Urine Clear Clear    POC Glucose, Urine NEGATIVE NEGATIVE mg/dl    POC Bilirubin, Urine NEGATIVE NEGATIVE    POC Ketones, Urine NEGATIVE NEGATIVE mg/dl    POC Specific Gravity, Urine 1.020 1.005 - 1.035    POC Blood, Urine NEGATIVE NEGATIVE    POC PH, Urine 7.0 No Reference Range Established PH    POC Protein, Urine 15 (1+) (A) NEGATIVE mg/dl    POC Urobilinogen, Urine 2.0 (A) 0.2, 1.0 EU/DL    Poc Nitrite, Urine NEGATIVE NEGATIVE    POC Leukocytes, Urine NEGATIVE NEGATIVE   Type And Screen   Result Value Ref  Range    ABO TYPE B     Rh TYPE POS     ANTIBODY SCREEN NEG    CBC   Result Value Ref Range    WBC 13.6 (H) 4.4 - 11.3 x10*3/uL    nRBC 0.0 0.0 - 0.0 /100 WBCs    RBC 3.69 (L) 4.00 - 5.20 x10*6/uL    Hemoglobin 11.2 (L) 12.0 - 16.0 g/dL    Hematocrit 33.1 (L) 36.0 - 46.0 %    MCV 90 80 - 100 fL    MCH 30.4 26.0 - 34.0 pg    MCHC 33.8 32.0 - 36.0 g/dL    RDW 13.2 11.5 - 14.5 %    Platelets 256 150 - 450 x10*3/uL        Anesthesia Plan    History of general anesthesia?: no  History of complications of general anesthesia?: unknown/emergency    ASA 2     epidural     Patient did not smoke on day of procedure.    Anesthetic plan and risks discussed with patient.  Use of blood products discussed with patient who consented to blood products.    Plan discussed with resident.

## 2025-05-26 NOTE — SIGNIFICANT EVENT
Labor check    S: Patient resting in bed. Reports contractions are closer together and becoming more intense.     SVE: 4/60/-2  FHT: 135/mod/+accel/-decel   [place]: q2-4min    A/P: Oswaldo is a 24 y.o.  at 37w5d who presented with SROM and in labor    Labor  - latent labor  - Discussed with patient given cervical change will continue expectant management. Will consider pitocin if contractions space.   - GBS neg  - epidural at patient request    Fetal Wellbeing  - CEFM, Cat I currently     Discussed with Dr. Russ Taylor MD  PGY1, Obstetrics and Gynecology

## 2025-05-26 NOTE — CARE PLAN
Problem: Vaginal Birth or  Section  Goal: Fetal and maternal status remain reassuring during the birth process  Outcome: Progressing  Goal: Demonstrates labor coping techniques through delivery  Outcome: Progressing  Goal: No s/sx of infection through recovery  Outcome: Progressing  Goal: No s/sx of hemorrhage through recovery  Outcome: Progressing     Problem: Fall/Injury  Goal: Not fall by end of shift  Outcome: Progressing      The clinical goals for the shift include safe delivery

## 2025-05-26 NOTE — LACTATION NOTE
Lactation Consultant Note  Lactation Consultation  Reason for Consult: Initial assessment, Other (Comment) (mom wants to exclusively pump)  Consultant Name: Lili Calderon RN, IBCLC    Maternal Information  Has mother  before?: Yes  How long did the mother previously breastfeed?: exclusively pumped for 4 months  Previous Maternal Breastfeeding Challenges: Exclusive pump and bottle fed  Infant to breast within first 2 hours of birth?: No  Breastfeeding Delayed Due to: Other (Comment) (wants to exclusively pump)  Exclusive Pump and Bottle Feed: Yes    Maternal Assessment  Breast Assessment: Medium, Soft  Nipple Assessment: Intact, Erect, Other (Comment) (mom h as bilateral nipple piercings- advised to take out piercings prior to pumping, she measures to be 16 MM diameter)  [place] Assessment: Normal    Infant Assessment  Infant Behavior: Deep sleep    Feeding Assessment  Nutrition Source: Formula (per mother’s request)  Feeding Method: Paced bottle, Other (Comment) (will start giving pumped breast milk once she starts obtaining pumped/ expressed breast milk)  Unable to assess infant feeding at this time: Other (Comment) (does not want to latch)    LATCH TOOL       Breast Pump  Pump: Hospital grade electric pump, Double breast pumping  Frequency: Other (comment) (oriented to pump set up- use- and cleaning)  Duration: Initiate phase  Breast Shield Size and Type: Other (comment) (18 MM)    Other OB Lactation Tools  Lactation Tools: Flanges    Patient Follow-up  Inpatient Lactation Follow-up Needed : Yes  Outpatient Lactation Follow-up: Recommended    Other OB Lactation Documentation  Maternal Risk Factors: Extreme tiredness, fatigue or stress, Other (comment)  Infant Risk Factors: Early term birth 37-39 weeks, Prelacteal feeds  Additional Problem Noted: exclusively pumping    Recommendations/Summary  Baby around 8 hours of life at time of visit. Mom verbalized her feeding plan is to exclusively pump and  will give formula to baby prior to her full milk supply coming in. She stated she did this with her last baby as well.   She pumped for around 3-4  months and she plans to do the same with this baby.     Measured her nipple diameter to be 16 MM- advised to use the 18 MM flanges.   She has bilateral nipple piercing's- advised her to take the piercing's out prior to pumping and she verbalized understanding.     Oriented mom to pump set up- use- and cleaning of pump parts.     Reviewed the difference between latching and pumping, the benefit of skin to skin, the benefits of breast massage prior to pumping, expectations of volume with pumping, milk storage and cleaning of pump parts.   PI-123 and [facility] pump cleaning guide given.     D/F pumping right at this time d/t mom's dinner here. Advised her to pump after she eats and to call out for assistance, if needed.    Encouraged her to pump every 3 hours (8-12 times in a 24 hour period) for 15 minutes on both breasts and to give the baby any pumped breast milk prior to any use of supplement.      She stated she needs a breast pump for at home. [facility] thinks her provider at [place] may have ordered her one (and this is who they usually use). But she is unsure.   Mom wants a hands free pump.     Denies any questions or concerns at this time.     Encouraged her to utilize the outpatient lactation resources, if needed.   Contact information given.   [phone] [facility] or [phone] [place]

## 2025-05-26 NOTE — ANESTHESIA PREPROCEDURE EVALUATION
Patient: Oswaldo Rene    Evaluation Method: In-person visit    Procedure Information    Date: 25  Procedure: Labor Consult     24yF  @ 37w5d admitted for SROM. Hx of PPROM, prior 3rd degree lac in G1, childhood asthma (no inhaler use or exacerbations since childhood),     Relevant Problems   Pulmonary   (+) Asthma affecting pregnancy in second trimester ([facility])      HEENT   (+) Seasonal allergies      GYN   (+) 37 weeks gestation of pregnancy ([facility])   (+) High-risk pregnancy in third trimester ([facility])       Clinical information reviewed:    Allergies  Meds               NPO Detail:  NPO/Void Status  Date of Last Liquid: 25  Time of Last Liquid:   Date of Last Solid: 25  Time of Last Solid:          OB/Gyn Evaluation    Present Pregnancy    Patient is pregnant now.  (-) anticoagulation use during pregnancy     Obstetric History      (-) difficult neuraxial anesthesia                Physical Exam    Airway  Mallampati: II  TM distance: >3 FB  Neck ROM: full  Mouth opening: 3 or more finger widths     Cardiovascular   Rhythm: regular  Rate: normal     Dental - normal exam     Pulmonary - normal exam   Abdominal          Results for orders placed or performed during the hospital encounter of 25 (from the past 24 hours)   POCT UA (nonautomated) manually resulted   Result Value Ref Range    POC Color, Urine Yellow Straw, Yellow, Light-Yellow    POC Appearance, Urine Clear Clear    POC Glucose, Urine NEGATIVE NEGATIVE mg/dl    POC Bilirubin, Urine NEGATIVE NEGATIVE    POC Ketones, Urine NEGATIVE NEGATIVE mg/dl    POC Specific Gravity, Urine 1.020 1.005 - 1.035    POC Blood, Urine NEGATIVE NEGATIVE    POC PH, Urine 7.0 No Reference Range Established PH    POC Protein, Urine 15 (1+) (A) NEGATIVE mg/dl    POC Urobilinogen, Urine 2.0 (A) 0.2, 1.0 EU/DL    Poc Nitrite, Urine NEGATIVE NEGATIVE    POC Leukocytes, Urine NEGATIVE NEGATIVE        Anesthesia Plan    History of  general anesthesia?: no  History of complications of general anesthesia?: unknown/emergency    ASA 2     epidural     Patient did not smoke on day of procedure.    Anesthetic plan and risks discussed with patient.  Use of blood products discussed with patient who consented to blood products.    Plan discussed with resident.

## 2025-05-27 ENCOUNTER — PHARMACY VISIT (OUTPATIENT)
Dept: PHARMACY | Facility: CLINIC | Age: 25
End: 2025-05-27
Payer: MEDICAID

## 2025-05-27 PROCEDURE — RXMED WILLOW AMBULATORY MEDICATION CHARGE

## 2025-05-27 PROCEDURE — 1210000001 HC SEMI-PRIVATE ROOM DAILY

## 2025-05-27 PROCEDURE — 2500000001 HC RX 250 WO HCPCS SELF ADMINISTERED DRUGS (ALT 637 FOR MEDICARE OP): Mod: SE | Performed by: STUDENT IN AN ORGANIZED HEALTH CARE EDUCATION/TRAINING PROGRAM

## 2025-05-27 RX ORDER — ACETAMINOPHEN 325 MG/1
975 TABLET ORAL EVERY 6 HOURS PRN
Qty: 180 TABLET | Refills: 0 | Status: SHIPPED | OUTPATIENT
Start: 2025-05-27

## 2025-05-27 RX ORDER — IBUPROFEN 600 MG/1
600 TABLET, FILM COATED ORAL EVERY 6 HOURS PRN
Qty: 60 TABLET | Refills: 0 | Status: SHIPPED | OUTPATIENT
Start: 2025-05-27

## 2025-05-27 RX ADMIN — ACETAMINOPHEN 975 MG: 325 TABLET ORAL at 04:47

## 2025-05-27 RX ADMIN — IBUPROFEN 600 MG: 600 TABLET ORAL at 22:42

## 2025-05-27 RX ADMIN — ACETAMINOPHEN 975 MG: 325 TABLET ORAL at 22:42

## 2025-05-27 RX ADMIN — IBUPROFEN 600 MG: 600 TABLET ORAL at 11:09

## 2025-05-27 RX ADMIN — IBUPROFEN 600 MG: 600 TABLET ORAL at 16:20

## 2025-05-27 RX ADMIN — IBUPROFEN 600 MG: 600 TABLET ORAL at 04:47

## 2025-05-27 RX ADMIN — ACETAMINOPHEN 975 MG: 325 TABLET ORAL at 16:20

## 2025-05-27 RX ADMIN — ACETAMINOPHEN 975 MG: 325 TABLET ORAL at 11:09

## 2025-05-27 ASSESSMENT — PAIN - FUNCTIONAL ASSESSMENT: PAIN_FUNCTIONAL_ASSESSMENT: 0-10

## 2025-05-27 ASSESSMENT — PAIN DESCRIPTION - LOCATION: LOCATION: ABDOMEN

## 2025-05-27 ASSESSMENT — PAIN SCALES - GENERAL
PAINLEVEL_OUTOF10: 2
PAINLEVEL_OUTOF10: 4

## 2025-05-27 NOTE — ANESTHESIA POSTPROCEDURE EVALUATION
Patient: Oswaldo Rene    Procedure Summary       Date: 25 Room / Location:     Anesthesia Start: 622 Anesthesia Stop: 820    Procedure: Labor Analgesia Diagnosis:     Scheduled Providers:  Responsible Provider: Ama Farr MD    Anesthesia Type: epidural ASA Status: 2            Anesthesia Type: epidural        Anesthesia Post Evaluation    Patient location during evaluation: bedside  Patient participation: complete - patient participated  Level of consciousness: awake and alert  Pain management: adequate  Airway patency: patent  Cardiovascular status: acceptable  Respiratory status: acceptable  Hydration status: acceptable  Postoperative Nausea and Vomiting: none  Comments: Oswaldo Rene is a 24 y.o., , who had a Vaginal, Spontaneous delivery on 2025 at 37w6d and is now POD1.    She had Neuraxial Anesthesia without immediate complications noted.       Pain well controlled    ---------------------------               25                      0838         ---------------------------   BP:          101/69         Pulse:         79           Resp:          16           Temp:   36.2 °C (97.2 °F)   SpO2:          97%         ---------------------------    Neuraxial site assessed-- epidural in situ, plan for PPTL, patient understands. No visible redness or swelling or drainage. Patient able to ambulate and move all extremities without difficulty. Able to void. No complaints of nausea/vomiting. Tolerating PO intake well. No s/sx of PDPH.     Anesthesia will sign off     SELINA Biggs         No notable events documented.

## 2025-05-27 NOTE — CARE PLAN
The patient's goals for the shift include Rest and pain control    The clinical goals for the shift include meeting PP milestones      Problem: Vaginal Birth or  Section  Goal: No s/sx of infection through recovery  Outcome: Progressing  Goal: No s/sx of hemorrhage through recovery  Outcome: Progressing     Problem: Pain  Goal: Turns in bed with improved pain control throughout the shift  Outcome: Progressing  Goal: Performs ADL's with improved pain control throughout shift  Outcome: Progressing       Patient with stable VS, Lochia WNL, bonding well with baby

## 2025-05-27 NOTE — CARE PLAN
The patient's goals for the shift include rest    The clinical goals for the shift include healthy mom and healthy baby    Problem: Pain  Goal: Takes deep breaths with improved pain control throughout the shift  Outcome: Progressing

## 2025-05-27 NOTE — LACTATION NOTE
This note was copied from a baby's chart.  Lactation Consultant Note       Recommendations/Summary  Rounded on mother this morning, she desires to exclusively pump and provide infant her expressed breast milk via bottle. When asked how pumping is going mother stated she has not pumped yet. I reviewed typical milk production patterns and the importance of early and frequent breast stimulation for the production of a full milk supply. I recommended pump every 2-3 hours using the initiate settings on the symphony pump. Encouraged mother to call for assistance and questions as needed.     A pump was ordered by previous LC and mother has already been provided with outpatient lactation contact information.

## 2025-05-27 NOTE — PROGRESS NOTES
Postpartum Progress Note    Assessment/Plan   Oswaldo Rene is a 24 y.o., , who was admitted for SROM, delivered at 37w6d gestation and is now postpartum day 1 s/p Vaginal, Spontaneous.     Routine postpartum care  - meeting all milestones  - bilateral periurethral lacerations,  mL  - bonding with female infant  - pain well controlled on po medications  - DVT Score (IF A SCORE IS NOT CALCULATING, MUST SELECT A BMI TO COMPLETE): 3 - encourage ambulation and  SCDs  - B+, Rhogam not indicated  - admission hgb: 11.2  - PPBC: desires ppTL, unable to be completed today d/t acuity of L&D, will place NPO order for midnight and attempt for TL tomorrow     Maternal Well-Being  - emotional support provided     Feeding  - breastfeeding/pumping encouraged; lactation consult prn    Dispo:  anticipate d/c on PPD #2 if meeting all postpartum milestones, for f/u 1 month with Primary OB provider    THOMAS Hayes    Assessment & Plan  37 weeks gestation of pregnancy ([facility])    Pregnancy Problems (from 10/31/24 to present)       Problem Noted Diagnosed Resolved    High-risk pregnancy in third trimester ([facility]) 3/4/2025 by Ana Martin MD  No    Priority:  Medium       History of third degree perineal laceration 2024 by Ramona Bowser MD  No    Priority:  Medium       Overview Signed 2024 11:02 AM by Ramona Bowser MD   In G1 pregnancy, 3629g infant  No residual deficits per her report  Desires          37 weeks gestation of pregnancy ([facility]) 10/31/2024 by Julianne Peters, MIRACLE-CNM, APRN-CNP  No    Priority:  Medium       Overview Addendum 2025 11:39 AM by Melvin Contreras MD   Desired provider in labor: [] CNM  [] Physician [x] Either  [x] Blood Products: [x] Yes, accepts [] No, needs counseling  [x] Initial BMI: 24.17   [x] Prenatal Labs: WNL 24   [x] Cervical Cancer Screening LSIL --> due for pap 2025  [x] Rh status: POS  [x] Genetic Screening:   cfDNA risk-reducing  [x] NT US: (11-13 wks)  WNL  [x] Baby ASA (if indicated): has already started  [x] Pregnancy dated by: 9w6d US    [x] Anatomy US: (19-20 wks) , 20w0d, normal anatomy, CL WNL, anterior placenta, no previa  [x] Federal Sterilization consent: signed 3/4  [x] 1hr GCT at 24-28wks: wnl  [x] Fetal Surveillance (if indicated): NA  [x] Tdap (27-32 wks): done 3/18  [x] Flu Vaccine: received at work    [x] Breastfeeding: breast pump Rx given  [x] Postpartum Birth control method: BTL, consents signed 3/4, IUD if unable  [x] GBS at 36 - 37 wks: neg  [x] 39 weeks discussion of IOL vs. Expectant management: 39wk IOL  [x] Mode of delivery (anticipated): vaginal         History of  premature rupture of membranes (PPROM) 10/31/2024 by Julianne Peters, APRN-CNM, APRN-CNP  No    Priority:  Medium       Overview Addendum 3/4/2025  2:27 PM by Ana Martin MD    PPROM --> PTB at 35w  Suspect abruption in prior pregnancy given report of continued bleeding throughout pregnancy  MFM consult -> CL screenings through 22w, all wnl         Asthma affecting pregnancy in second trimester ([facility]) 2024 by Ramona Stinson MD  No    Priority:  Medium       Overview Addendum 3/4/2025  2:28 PM by Ana Martin MD   Diagnosed age 1  Triggers pollen, bleach, dander  No inhaler use since elementary school         Nausea and vomiting in pregnancy prior to 22 weeks gestation 2024 by Ramona Bowser MD  3/4/2025 by Ana Martin MD    Priority:  Medium       Overview Addendum 3/4/2025  2:27 PM by Ana Martin MD   Able to tolerate PO but still having nasuea/rare emesis with unisom and b6  States that symptoms have improved               Subjective   Her pain is well controlled with current medications  She is passing flatus  She is ambulating well  She is tolerating a Adult diet Regular  She reports no breast or nursing problems  She denies emotional concerns today      Objective   Allergies:   Kiwi and Pollen extracts         Last Vitals:  Temp Pulse Resp BP MAP Pulse Ox   36.2 °C (97.2 °F) 79 16 101/69   97 %     Vitals Min/Max Last 24 Hours:  Temp  Min: 36.1 °C (97 °F)  Max: 36.4 °C (97.5 °F)  Pulse  Min: 71  Max: 96  Resp  Min: 16  Max: 18  BP  Min: 90/56  Max: 104/64    Physical Exam:  General: well appearing, well-nourished, postpartum  Obstetric: abdomen soft/non-tender, fundus firm below umbilicus, lochia light  Skin: No rashes/lesions/erythema  Neuro: A/Ox3, conversational  GI: +flatus  Respiratory: Even and unlabored on RA  Extremities: No edema, discoloration, or pain in BLE, equal and palpable DP and PT pulses    Psych: appropriate mood and affect     Lab Data:  Lab Results   Component Value Date    WBC 13.6 (H) 05/25/2025    HGB 11.2 (L) 05/25/2025    HCT 33.1 (L) 05/25/2025     05/25/2025

## 2025-05-28 ENCOUNTER — ANESTHESIA (OUTPATIENT)
Dept: OBSTETRICS AND GYNECOLOGY | Facility: HOSPITAL | Age: 25
End: 2025-05-28
Payer: COMMERCIAL

## 2025-05-28 ENCOUNTER — ANESTHESIA EVENT (OUTPATIENT)
Dept: OBSTETRICS AND GYNECOLOGY | Facility: HOSPITAL | Age: 25
End: 2025-05-28
Payer: COMMERCIAL

## 2025-05-28 LAB
ERYTHROCYTE [DISTWIDTH] IN BLOOD BY AUTOMATED COUNT: 13.8 % (ref 11.5–14.5)
HCT VFR BLD AUTO: 33.9 % (ref 36–46)
HGB BLD-MCNC: 11.1 G/DL (ref 12–16)
MCH RBC QN AUTO: 29.3 PG (ref 26–34)
MCHC RBC AUTO-ENTMCNC: 32.7 G/DL (ref 32–36)
MCV RBC AUTO: 89 FL (ref 80–100)
NRBC BLD-RTO: 0 /100 WBCS (ref 0–0)
PLATELET # BLD AUTO: 254 X10*3/UL (ref 150–450)
RBC # BLD AUTO: 3.79 X10*6/UL (ref 4–5.2)
WBC # BLD AUTO: 18.1 X10*3/UL (ref 4.4–11.3)

## 2025-05-28 PROCEDURE — 2500000004 HC RX 250 GENERAL PHARMACY W/ HCPCS (ALT 636 FOR OP/ED): Mod: JZ,SE

## 2025-05-28 PROCEDURE — 36415 COLL VENOUS BLD VENIPUNCTURE: CPT | Performed by: NURSE PRACTITIONER

## 2025-05-28 PROCEDURE — 58605 DIVISION OF FALLOPIAN TUBE: CPT | Performed by: STUDENT IN AN ORGANIZED HEALTH CARE EDUCATION/TRAINING PROGRAM

## 2025-05-28 PROCEDURE — 7100000016 HC LABOR RECOVERY PER HOUR: Performed by: STUDENT IN AN ORGANIZED HEALTH CARE EDUCATION/TRAINING PROGRAM

## 2025-05-28 PROCEDURE — 1210000001 HC SEMI-PRIVATE ROOM DAILY

## 2025-05-28 PROCEDURE — 2720000007 HC OR 272 NO HCPCS

## 2025-05-28 PROCEDURE — 3700000014 HC AN EPIDURAL BLOCK CHARGE: Performed by: STUDENT IN AN ORGANIZED HEALTH CARE EDUCATION/TRAINING PROGRAM

## 2025-05-28 PROCEDURE — 2500000001 HC RX 250 WO HCPCS SELF ADMINISTERED DRUGS (ALT 637 FOR MEDICARE OP): Mod: SE | Performed by: NURSE PRACTITIONER

## 2025-05-28 PROCEDURE — 1220000001 HC OB SEMI-PRIVATE ROOM DAILY

## 2025-05-28 PROCEDURE — 2500000001 HC RX 250 WO HCPCS SELF ADMINISTERED DRUGS (ALT 637 FOR MEDICARE OP): Mod: SE | Performed by: STUDENT IN AN ORGANIZED HEALTH CARE EDUCATION/TRAINING PROGRAM

## 2025-05-28 PROCEDURE — 7200000001 HC TUBAL LIGATION: Performed by: STUDENT IN AN ORGANIZED HEALTH CARE EDUCATION/TRAINING PROGRAM

## 2025-05-28 PROCEDURE — 88302 TISSUE EXAM BY PATHOLOGIST: CPT | Performed by: PATHOLOGY

## 2025-05-28 PROCEDURE — 0UB70ZZ EXCISION OF BILATERAL FALLOPIAN TUBES, OPEN APPROACH: ICD-10-PCS | Performed by: STUDENT IN AN ORGANIZED HEALTH CARE EDUCATION/TRAINING PROGRAM

## 2025-05-28 PROCEDURE — 0751T DGTZ GLS MCRSCP SLD LEVEL II: CPT | Mod: TC,SUR

## 2025-05-28 PROCEDURE — 2500000001 HC RX 250 WO HCPCS SELF ADMINISTERED DRUGS (ALT 637 FOR MEDICARE OP): Mod: SE

## 2025-05-28 PROCEDURE — 85027 COMPLETE CBC AUTOMATED: CPT | Performed by: NURSE PRACTITIONER

## 2025-05-28 RX ORDER — LIDOCAINE HCL/EPINEPHRINE/PF 2%-1:200K
VIAL (ML) INJECTION AS NEEDED
Status: DISCONTINUED | OUTPATIENT
Start: 2025-05-28 | End: 2025-05-28

## 2025-05-28 RX ORDER — ONDANSETRON HYDROCHLORIDE 2 MG/ML
INJECTION, SOLUTION INTRAVENOUS AS NEEDED
Status: DISCONTINUED | OUTPATIENT
Start: 2025-05-28 | End: 2025-05-28

## 2025-05-28 RX ORDER — PHENYLEPHRINE HCL IN 0.9% NACL 0.4MG/10ML
SYRINGE (ML) INTRAVENOUS AS NEEDED
Status: DISCONTINUED | OUTPATIENT
Start: 2025-05-28 | End: 2025-05-28

## 2025-05-28 RX ORDER — FAMOTIDINE 10 MG/ML
INJECTION INTRAVENOUS AS NEEDED
Status: DISCONTINUED | OUTPATIENT
Start: 2025-05-28 | End: 2025-05-28

## 2025-05-28 RX ORDER — OXYCODONE HYDROCHLORIDE 5 MG/1
5 TABLET ORAL EVERY 4 HOURS PRN
Refills: 0 | Status: DISCONTINUED | OUTPATIENT
Start: 2025-05-28 | End: 2025-05-29 | Stop reason: HOSPADM

## 2025-05-28 RX ORDER — KETOROLAC TROMETHAMINE 30 MG/ML
INJECTION, SOLUTION INTRAMUSCULAR; INTRAVENOUS AS NEEDED
Status: DISCONTINUED | OUTPATIENT
Start: 2025-05-28 | End: 2025-05-28

## 2025-05-28 RX ORDER — FENTANYL CITRATE 50 UG/ML
INJECTION, SOLUTION INTRAMUSCULAR; INTRAVENOUS AS NEEDED
Status: DISCONTINUED | OUTPATIENT
Start: 2025-05-28 | End: 2025-05-28

## 2025-05-28 RX ADMIN — LIDOCAINE HYDROCHLORIDE,EPINEPHRINE BITARTRATE 3 ML: 20; .005 INJECTION, SOLUTION EPIDURAL; INFILTRATION; INTRACAUDAL; PERINEURAL at 12:39

## 2025-05-28 RX ADMIN — Medication 120 MCG: at 12:37

## 2025-05-28 RX ADMIN — LIDOCAINE HYDROCHLORIDE,EPINEPHRINE BITARTRATE 5 ML: 20; .005 INJECTION, SOLUTION EPIDURAL; INFILTRATION; INTRACAUDAL; PERINEURAL at 12:02

## 2025-05-28 RX ADMIN — FAMOTIDINE 20 MG: 10 INJECTION, SOLUTION INTRAVENOUS at 11:50

## 2025-05-28 RX ADMIN — KETOROLAC TROMETHAMINE 30 MG: 30 INJECTION, SOLUTION INTRAMUSCULAR; INTRAVENOUS at 12:52

## 2025-05-28 RX ADMIN — IBUPROFEN 600 MG: 600 TABLET ORAL at 04:33

## 2025-05-28 RX ADMIN — FENTANYL CITRATE 100 MCG: 50 INJECTION, SOLUTION INTRAMUSCULAR; INTRAVENOUS at 12:02

## 2025-05-28 RX ADMIN — ONDANSETRON 4 MG: 2 INJECTION, SOLUTION INTRAMUSCULAR; INTRAVENOUS at 11:50

## 2025-05-28 RX ADMIN — ACETAMINOPHEN 975 MG: 325 TABLET ORAL at 23:58

## 2025-05-28 RX ADMIN — IBUPROFEN 600 MG: 600 TABLET ORAL at 18:26

## 2025-05-28 RX ADMIN — ACETAMINOPHEN 975 MG: 325 TABLET ORAL at 04:32

## 2025-05-28 RX ADMIN — SODIUM CHLORIDE, SODIUM LACTATE, POTASSIUM CHLORIDE, AND CALCIUM CHLORIDE: 600; 310; 30; 20 INJECTION, SOLUTION INTRAVENOUS at 12:09

## 2025-05-28 RX ADMIN — ACETAMINOPHEN 975 MG: 325 TABLET ORAL at 18:26

## 2025-05-28 RX ADMIN — IBUPROFEN 600 MG: 600 TABLET ORAL at 23:58

## 2025-05-28 RX ADMIN — OXYCODONE 5 MG: 5 TABLET ORAL at 21:48

## 2025-05-28 RX ADMIN — LIDOCAINE HYDROCHLORIDE,EPINEPHRINE BITARTRATE 5 ML: 20; .005 INJECTION, SOLUTION EPIDURAL; INFILTRATION; INTRACAUDAL; PERINEURAL at 11:54

## 2025-05-28 RX ADMIN — LIDOCAINE HYDROCHLORIDE,EPINEPHRINE BITARTRATE 5 ML: 20; .005 INJECTION, SOLUTION EPIDURAL; INFILTRATION; INTRACAUDAL; PERINEURAL at 11:56

## 2025-05-28 RX ADMIN — Medication 160 MCG: at 12:33

## 2025-05-28 ASSESSMENT — PAIN DESCRIPTION - ORIENTATION: ORIENTATION: LOWER

## 2025-05-28 ASSESSMENT — PAIN SCALES - GENERAL
PAINLEVEL_OUTOF10: 4
PAINLEVEL_OUTOF10: 6
PAINLEVEL_OUTOF10: 7
PAINLEVEL_OUTOF10: 9
PAINLEVEL_OUTOF10: 0 - NO PAIN
PAINLEVEL_OUTOF10: 9
PAIN_LEVEL: 0
PAINLEVEL_OUTOF10: 4

## 2025-05-28 ASSESSMENT — PAIN DESCRIPTION - LOCATION
LOCATION: INCISION
LOCATION: INCISION
LOCATION: ABDOMEN

## 2025-05-28 ASSESSMENT — PAIN - FUNCTIONAL ASSESSMENT
PAIN_FUNCTIONAL_ASSESSMENT: 0-10

## 2025-05-28 ASSESSMENT — PAIN DESCRIPTION - DESCRIPTORS
DESCRIPTORS: CRAMPING
DESCRIPTORS: SHARP
DESCRIPTORS: CRAMPING

## 2025-05-28 NOTE — CARE PLAN
The patient's goals for the shift include TL    The clinical goals for the shift include light to scant lochia      Problem: Postpartum  Goal: Experiences normal postpartum course  Outcome: Progressing  Goal: Appropriate maternal -  bonding  Outcome: Progressing  Goal: No s/sx of hemorrhage  Outcome: Progressing  Goal: Minimal s/sx of HDP and BP<160/110  Outcome: Progressing     Problem: Fall/Injury  Goal: Not fall by end of shift  Outcome: Progressing     Patient has had stable VS and assessments, pain well controlled and bleeding has been appropriate during this shift.  Patient went to L&D for BTL. Void x1 since BTL. Bonding with

## 2025-05-28 NOTE — ANESTHESIA PREPROCEDURE EVALUATION
Patient: Oswaldo Rene    Procedure Information    Date: 05/28/25  Procedure: Procedure Not Yet Scheduled         Relevant Problems   Anesthesia (within normal limits)      Cardiac (within normal limits)      Pulmonary   (+) Asthma affecting pregnancy in second trimester ([facility])      Neuro (within normal limits)      GI (within normal limits)      /Renal (within normal limits)      Liver (within normal limits)      Endocrine (within normal limits)      Hematology (within normal limits)      Musculoskeletal (within normal limits)      HEENT   (+) Seasonal allergies      GYN   (+) 37 weeks gestation of pregnancy ([facility])   (+) High-risk pregnancy in third trimester ([facility])       Clinical information reviewed:    Allergies  Meds  Problems              NPO Detail:  No data recorded     Physical Exam    Airway  Mallampati: II  TM distance: >3 FB  Neck ROM: full  Mouth opening: 3 or more finger widths     Cardiovascular    Dental    Pulmonary    Abdominal            Anesthesia Plan    History of general anesthesia?: no  History of complications of general anesthesia?: no    ASA 2     epidural     Anesthetic plan and risks discussed with patient.  Use of blood products discussed with patient who consented to blood products.    Plan discussed with CRNA and attending.

## 2025-05-28 NOTE — ANESTHESIA POSTPROCEDURE EVALUATION
Patient: Oswaldo Rene    Procedure Summary       Date: 05/28/25 Room / Location:     Anesthesia Start: 1154 Anesthesia Stop: 1312    Procedure: Procedure Not Yet Scheduled Diagnosis:     Scheduled Providers:  Responsible Provider: Tara Du MD    Anesthesia Type: epidural ASA Status: 2            Anesthesia Type: epidural    Vitals Value Taken Time   BP 97/57 05/28/25 13:10   Temp 36 °C (96.8 °F) 05/28/25 13:10   Pulse 78 05/28/25 13:10   Resp 12 05/28/25 13:12   SpO2 97 % 05/28/25 13:10       Anesthesia Post Evaluation    Patient location during evaluation: floor  Patient participation: complete - patient participated  Level of consciousness: awake and alert  Pain score: 0  Pain management: satisfactory to patient  Airway patency: patent  Cardiovascular status: acceptable and blood pressure returned to baseline  Respiratory status: acceptable, spontaneous ventilation and room air  Hydration status: acceptable  Postoperative Nausea and Vomiting: none        No notable events documented.

## 2025-05-28 NOTE — SIGNIFICANT EVENT
Clinical Update    Called to room to assess postpartum tubal incision for oozing after walking to the bathroom. Incision not well approximated with oozing (bandage had just been saturated and replaced). Expressed additional 15 ml of blood from incision with no additional oozing. Abdomen soft. Pain well controlled. Incision re-approximated with steri strips. Bandage replaced. Patient remains asymptomatic. Vitals taken now as below:    Visit Vitals  BP 94/58   Pulse 84   Temp 36.8 °C (98.2 °F) (Oral)   Resp 16      Discussed low suspicion for intra-abdominal bleeding but will send CBC now and continue to monitor closely. Also discussed possibly of needing to return to OR for further evaluation.    Sandra Heller MD  OBGYN Attending

## 2025-05-28 NOTE — PROGRESS NOTES
Oswaldo Rene is a 24 y.o. female on day 3 of admission presenting with 37 weeks gestation of pregnancy ([facility]).    SW consult placed for lack of a car seat. Nicolle met with pt at bedside.     Patient Information:  Name: Osawldo Rene   : 2000  MRN: [id number]    Address: [address] [place] [id number]  Phone: [phone]    Per Ms. Rene, she already purchased a car seat today through the  [employer]. Should be delivered today. Also inquired about obtaining a ride home. Nicolle explained [employer] and scheduled a [employer] Call ride with insurance ([place] [employer]) as the payor. Ride ID [id number]. Ms. Rene understands to click the link in the text message sent to her cell phone when she is ready to be picked up. Explained 5 minute wait time rule.    No further needs. Please re-consult if additional concerns arise.     Social Work will continue to remain available for any questions or concerns. Please feel free to reach out.     CLR WHEN MEDICALLY READY FROM A SW PERSPECTIVE.    2025   UMA [place]  OB/GYN   Office Phone: [phone]  Secure chat preferred      Problem: Patient Care Overview  Goal: Plan of Care Review  Outcome: Ongoing (interventions implemented as appropriate)  Patient admit 8/22 from nursing home with aspiration PNA/UTI; to CCU for vasopressor support. Patient currently on Levo at 0.02mcg/kg and IVF at 125cc/hr; U/O improving. Remaining vitals stable, T-max 100.6 this shift.  Patient  alert; oriented to person only(baseline). PEG tube with feedings at 35cc/hr currently, tolerating well. Sacral wound noted per flow sheet with wound care following. Instructed patient/family on POC with verbalized understanding.

## 2025-05-28 NOTE — OP NOTE
Operative Report     Patient Name: Oswaldo Rene   : 2000   MRN: [id number]   Age: 24 y.o.     /Para:   Gestational Age: 37w6d    Date of Surgery: 25  Operating Room Location: [place] [address] OB    Pre-op Diagnosis:  S/p   Desire for permanent sterilization     Post-op Diagnosis: Same    Procedures: Bilateral Salpingectomy    Surgeon: Sandra Heller MD    Resident/Fellow/Other Assistant: Africa Garvin MD, PGY1    Anesthesia:  Epidural     Surgical Staff:  Circulator: Veronica Schuler RN  Scrub Person: Kelley Rhodesrevabritney    Preoperative Antibiotics: None    Indication for Procedure:   Oswaldo Rene is a 24 y.o.  s/p  on  presents for postpartum tubal sterilization.     Informed Consent:  The risks, benefits, and alternatives were discussed with the patient, including the risks of bleeding, infection, injury to surrounding organ structures, and in the setting of life-threatening bleeding, the need to perform additional surgery. Surgical consent was obtained.      Findings: Normal appearing bilateral fallopian tubes and ovaries.     Description of Procedure:   After informed consent was obtained, the patient was taken to the operating room and epidural anesthesia was administered.  She was then positioned in the supine position and prepped and draped in the normal sterile fashion.      An 3 cm incision was made at the base of the umbilicus. The fascia was then grasped, tented up with Kocher clamps, and incised sharply. The fascial incision was extended laterally. The fascia was tagged with 0-vicryl. The peritoneum was grasped, tented up, and incised with Metzenbaum scissors. Intraabdominal entry was confirmed visually.      The small Peter retractor was placed. The left fallopian tube was then identified and grasped with Norm clamps. The small LigaSure device was used to transect along the mesosalpinx inferior to the fallopian tube and then  transect the fallopian tube at the level of the cornua. The fallopian tube was removed from the abdomen and sent to pathology. The left mesosalpinx was found to be hemostatic. Attention was then turned to the right fallopian tube, which was transected and removed in the same fashion. Initial brisk bleeding note at the cornua during transection of right fallopian tube which was further cauterized and found to be hemostatic.     The Peter retractor was then removed from the abdomen. The fascia was closed with an 0-vicryl suture. The skin was also closed with 4-0 Monocryl in a subcuticular fashion. Island dressing placed.    Complications: None    Estimated Blood Loss: 25 mL    Intraoperative Fluids: 500 mL    Urine Output: 200 mL    Blood products: None    Uterotonics/Hemostatic Agent: None    Specimen: Right fallopian tube, left fallopian tube     Sponge/Instrument/Needle Counts: The sponge, lap and needle counts were correct x3.    Patient Disposition: Patient recovering on labor and delivery.     Sandra Heller MD  OBGYN Attending

## 2025-05-28 NOTE — PROGRESS NOTES
Postpartum Progress Note    Assessment/Plan   Oswaldo Rene is a 24 y.o., , who delivered at 37w6d gestation    Now PPD#2 s/p Vaginal, Spontaneous on 2025  - Continue routine postpartum care  - Pain well controlled on po medications  - DVT risk score DVT Score (IF A SCORE IS NOT CALCULATING, MUST SELECT A BMI TO COMPLETE): 3 , ppx with SCDs and ambulation  - RH positive, rhogam not indicated  - Hgb:   Results from last 7 days   Lab Units 25  2257   HEMOGLOBIN g/dL 11.2*      Maternal Well-Being  - Vitals stable  - All questions and concerns address     Feeding  - Breastfeeding/pumping encouraged  - Lactation consult prn    Contraception  - planning tubal today prior to discharge    Dispo  - Anticipate d/c on PPD #2 if meeting all postpartum milestones  - Follow-up in 4-6wks with primary OGYN    SHILPI Varma       PM Update: 25 at 1710  S/p BTL this afternoon. Pt now ambulating in room and voided x 1.   Moderate oozing at tubal site reported by pt's RN. Dr. Heller to bedside to evaluate. Steri strips and bandage applied. Continue to monitor.  Repeat CBC now. VSS.     Will hold discharge until  to continue to monitor tubal site and follow up labs. Pt would not have transportation later this evening for discharge.   Discussed plan of care with Dr. Heller. See significant event note.    SHILPI Varma    Assessment & Plan  37 weeks gestation of pregnancy ([facility])    Postpartum state ([facility])    Pregnancy Problems (from 10/31/24 to present)       Problem Noted Diagnosed Resolved    High-risk pregnancy in third trimester ([facility]) 3/4/2025 by Ana Martin MD  No    Priority:  Medium       History of third degree perineal laceration 2024 by Ramona Bowser MD  No    Priority:  Medium       Overview Signed 2024 11:02 AM by Ramona Bowser MD   In G1 pregnancy, 3629g infant  No residual deficits per her report  Desires           37 weeks gestation of pregnancy ([facility]) 10/31/2024 by MIRACLE Barraza-CNM, APRN-CNP  No    Priority:  Medium       Overview Addendum 2025 11:39 AM by Melvin Contreras MD   Desired provider in labor: [] CNM  [] Physician [x] Either  [x] Blood Products: [x] Yes, accepts [] No, needs counseling  [x] Initial BMI: 24.17   [x] Prenatal Labs: WNL 24   [x] Cervical Cancer Screening LSIL --> due for pap 2025  [x] Rh status: POS  [x] Genetic Screening:  cfDNA risk-reducing  [x] NT US: (11-13 wks)  WNL  [x] Baby ASA (if indicated): has already started  [x] Pregnancy dated by: 9w6d US    [x] Anatomy US: (19-20 wks) , 20w0d, normal anatomy, CL WNL, anterior placenta, no previa  [x] Federal Sterilization consent: signed 3/4  [x] 1hr GCT at 24-28wks: wnl  [x] Fetal Surveillance (if indicated): NA  [x] Tdap (27-32 wks): done 3/18  [x] Flu Vaccine: received at work    [x] Breastfeeding: breast pump Rx given  [x] Postpartum Birth control method: BTL, consents signed 3/4, IUD if unable  [x] GBS at 36 - 37 wks: neg  [x] 39 weeks discussion of IOL vs. Expectant management: 39wk IOL  [x] Mode of delivery (anticipated): vaginal         History of  premature rupture of membranes (PPROM) 10/31/2024 by Julianne Peters, MIRACLE-RICARDA, APRN-CNP  No    Priority:  Medium       Overview Addendum 3/4/2025  2:27 PM by Ana Martin MD    PPROM --> PTB at 35w  Suspect abruption in prior pregnancy given report of continued bleeding throughout pregnancy  MFM consult -> CL screenings through 22w, all wnl         Asthma affecting pregnancy in second trimester ([facility]) 2024 by Ramona Stinson MD  No    Priority:  Medium       Overview Addendum 3/4/2025  2:28 PM by Ana Martin MD   Diagnosed age 1  Triggers pollen, bleach, dander  No inhaler use since elementary school         Nausea and vomiting in pregnancy prior to 22 weeks gestation 2024 by Ramona Bowser MD  3/4/2025 by Ana  MD Mario    Priority:  Medium       Overview Addendum 3/4/2025  2:27 PM by Ana Martin MD   Able to tolerate PO but still having nasuea/rare emesis with unisom and b6  States that symptoms have improved                 Subjective     Oswaldo Rene is PPD#2 s/p vaginal delivery who reports feeling overall well.  No acute events overnight.  Voiding spontaneously, passing flatus.  Pain well controlled on PO meds.  Light lochia. Tolerating diet.  Denies HA, N/V, RUQ pain, vision changes, chest pain, or SOB. Denies dizziness/lightheadedness/LOC/uncontrolled bleeding.. Encouraged ambulation in halls and increased hydration.     Objective   Allergies:   Kiwi and Pollen extracts         Last Vitals:  Temp Pulse Resp BP MAP Pulse Ox   36 °C (96.8 °F) 89 17 (!) 88/55 (Anesthesia notified of low bp, no new orders at this time)   97 %     Vitals Min/Max Last 24 Hours:  Temp  Min: 36 °C (96.8 °F)  Max: 36.5 °C (97.7 °F)  Pulse  Min: 70  Max: 100  Resp  Min: 16  Max: 20  BP  Min: 88/55  Max: 100/56    Intake/Output:     Intake/Output Summary (Last 24 hours) at 5/28/2025 1414  Last data filed at 5/28/2025 1312  Gross per 24 hour   Intake 500 ml   Output 575 ml   Net -75 ml     Physical Exam:  General: examination reveals a well developed, well nourished, female, in no acute distress. She is alert and cooperative.  Lungs: breathing even and unlabored, lungs clear all fields  Cardiac: warm and well perfused, heart rate regular, no murmur  Fundus: firm and below umbilicus, lochia light  Abdominal: soft, non tender, non-distended, bowel sounds active  Extremities: no redness or tenderness in the calves or thighs, edema: not present  Neurological: alert, oriented, normal speech, no focal findings or movement disorder noted.  Psychological: awake and alert; oriented to person, place, and time.  Skin: no rashes or lesions    Lab Data:  0   Lab Value Date/Time    GRPBSTREP SEE NOTE 05/13/2025 1201

## 2025-05-29 ENCOUNTER — PHARMACY VISIT (OUTPATIENT)
Dept: PHARMACY | Facility: CLINIC | Age: 25
End: 2025-05-29
Payer: MEDICAID

## 2025-05-29 VITALS
OXYGEN SATURATION: 97 % | TEMPERATURE: 97.7 F | HEIGHT: 61 IN | SYSTOLIC BLOOD PRESSURE: 96 MMHG | RESPIRATION RATE: 16 BRPM | HEART RATE: 93 BPM | WEIGHT: 166.01 LBS | DIASTOLIC BLOOD PRESSURE: 60 MMHG | BODY MASS INDEX: 31.34 KG/M2

## 2025-05-29 LAB
ERYTHROCYTE [DISTWIDTH] IN BLOOD BY AUTOMATED COUNT: 14 % (ref 11.5–14.5)
HCT VFR BLD AUTO: 32.9 % (ref 36–46)
HGB BLD-MCNC: 10.7 G/DL (ref 12–16)
MCH RBC QN AUTO: 29.6 PG (ref 26–34)
MCHC RBC AUTO-ENTMCNC: 32.5 G/DL (ref 32–36)
MCV RBC AUTO: 91 FL (ref 80–100)
NRBC BLD-RTO: 0 /100 WBCS (ref 0–0)
PLATELET # BLD AUTO: 225 X10*3/UL (ref 150–450)
RBC # BLD AUTO: 3.62 X10*6/UL (ref 4–5.2)
WBC # BLD AUTO: 14.4 X10*3/UL (ref 4.4–11.3)

## 2025-05-29 PROCEDURE — 99238 HOSP IP/OBS DSCHRG MGMT 30/<: CPT | Performed by: STUDENT IN AN ORGANIZED HEALTH CARE EDUCATION/TRAINING PROGRAM

## 2025-05-29 PROCEDURE — 85027 COMPLETE CBC AUTOMATED: CPT | Performed by: NURSE PRACTITIONER

## 2025-05-29 PROCEDURE — RXMED WILLOW AMBULATORY MEDICATION CHARGE

## 2025-05-29 PROCEDURE — 2500000004 HC RX 250 GENERAL PHARMACY W/ HCPCS (ALT 636 FOR OP/ED): Mod: SE

## 2025-05-29 PROCEDURE — 36415 COLL VENOUS BLD VENIPUNCTURE: CPT | Performed by: NURSE PRACTITIONER

## 2025-05-29 PROCEDURE — 2500000001 HC RX 250 WO HCPCS SELF ADMINISTERED DRUGS (ALT 637 FOR MEDICARE OP): Mod: SE | Performed by: NURSE PRACTITIONER

## 2025-05-29 RX ORDER — NALOXONE HYDROCHLORIDE 4 MG/.1ML
SPRAY NASAL
Qty: 2 EACH | Refills: 0 | OUTPATIENT
Start: 2025-05-29

## 2025-05-29 RX ORDER — OXYCODONE HYDROCHLORIDE 5 MG/1
5 TABLET ORAL EVERY 6 HOURS PRN
Qty: 3 TABLET | Refills: 0 | Status: SHIPPED | OUTPATIENT
Start: 2025-05-29

## 2025-05-29 RX ADMIN — ACETAMINOPHEN 975 MG: 325 TABLET ORAL at 11:42

## 2025-05-29 RX ADMIN — ACETAMINOPHEN 975 MG: 325 TABLET ORAL at 05:59

## 2025-05-29 RX ADMIN — IBUPROFEN 600 MG: 600 TABLET ORAL at 05:59

## 2025-05-29 RX ADMIN — SODIUM CHLORIDE, SODIUM LACTATE, POTASSIUM CHLORIDE, AND CALCIUM CHLORIDE 500 ML: .6; .31; .03; .02 INJECTION, SOLUTION INTRAVENOUS at 04:43

## 2025-05-29 RX ADMIN — IBUPROFEN 600 MG: 600 TABLET ORAL at 11:42

## 2025-05-29 ASSESSMENT — PAIN SCALES - GENERAL
PAINLEVEL_OUTOF10: 4
PAINLEVEL_OUTOF10: 7
PAINLEVEL_OUTOF10: 4

## 2025-05-29 ASSESSMENT — PAIN DESCRIPTION - DESCRIPTORS
DESCRIPTORS: CRAMPING
DESCRIPTORS: ACHING;CRAMPING
DESCRIPTORS: CRAMPING

## 2025-05-29 NOTE — CARE PLAN
Problem: Fall/Injury  Goal: Not fall by end of shift  Outcome: Progressing     Problem: Postpartum  Goal: Experiences normal postpartum course  Outcome: Progressing  Goal: Appropriate maternal -  bonding  Outcome: Progressing  Goal: No s/sx of hemorrhage  Outcome: Progressing  Goal: Minimal s/sx of HDP and BP<160/110  Outcome: Progressing   The patient's goals for the shift include rest    The clinical goals for the shift include pain conrol    VSS, bleeding and swelling WNL, pain controlled with medications, bottle feeding.

## 2025-05-29 NOTE — PROGRESS NOTES
Oswaldo Rene is a 24 y.o. female on day 4 of admission presenting with 37 weeks gestation of pregnancy ([facility]).    SWer noticed that pt was still admitted to hospital, therefore, needed the [employer] ride rescheduled. SWer rescheduled her Will Call ride to today, 5/29/25. Ride ID [id number].  location is [facility]. Pt will need to click the link sent to her cell phone ([phone]) to let them know she is ready to leave. Transport can only wait 5 minutes, otherwise it counts as a no show.    Social Work will continue to remain available for any questions or concerns. Please feel free to reach out.     PT REMAINS CLR FROM A SW PERSPECTIVE    5/29/2025    UMA Glasgow  OB/GYN   Office Phone: [phone]  Secure chat preferred

## 2025-05-29 NOTE — SIGNIFICANT EVENT
"  0440 Update    RN notified of BP 86/52. To bedside. Pt reports that she is feeling good, no concerns. Reports her Bps tend to run lower in the 90s over 60s. Denies lightheadedness or dizziness. Voiding well. Tolerating PO. Passing gas. Pain is tolerable.    BP (!) 86/52   Pulse 93   Temp 36.4 °C (97.5 °F) (Temporal)   Resp 16   Ht 1.549 m (5' 1\")   Wt 75.3 kg (166 lb 0.1 oz)   LMP 09/01/2024 (Within Weeks)   SpO2 96%   Breastfeeding No   BMI 31.37 kg/m²     Constitutional: No visible distress, alert and cooperative  Respiratory/Thorax: Normal respiratory effort on RA, CTAB  Cardiovascular: Reg rate  Gastrointestinal: soft, nondistended, appropriately tender, fundus firm. Incision covered with a gauze dressing without shadowing   Neurological: A&Ox3  Psychological: Appropriate mood and behavior    Low BP   - asymptomatic   - no oozing or shadowing noted on the dressing  - pt otherwise meeting postoperative milestones    -encouraged Po hydration  - will give 500 ml bolus  - followup AM CBC    Mary Junior MD  PGY-1, Obstetrics and Gynecology       "

## 2025-05-29 NOTE — DISCHARGE SUMMARY
Postpartum Discharge Summary    Admission Date: 2025  Discharge Date: 25     Discharge Diagnosis  Problem List Items Addressed This Visit       Postpartum state ([facility])    Relevant Orders    Case Request Operating Room: LIGATION, FALLOPIAN TUBE, POSTPARTUM (Completed)     Other Visit Diagnoses          (normal spontaneous vaginal delivery) ([facility])    -  Primary    Relevant Medications    acetaminophen (Tylenol) 325 mg tablet    ibuprofen 600 mg tablet      Post-operative state        Relevant Medications    oxyCODONE (Roxicodone) 5 mg immediate release tablet             Oswaldo Rene is a 24 y.o. female now  and postpartum day 3      Pregnancy notable for:  -hx PPROM and PTD @35wga, baby was adopted (not to be discussed with family)  -asthma, no recent inhaler use     Hospital Course  Admitted for SROM  Delivery Date: 2025 8:20 AM  Delivery type: Vaginal, Spontaneous   GA at delivery: 37w6d  Outcome: Living  Anesthesia during delivery: Epidural  Intrapartum complications: None  Feeding method: Breastfeeding Status: No     Delivery complications: none  Contraception at discharge: s/p bilateral salpingectomy    Complications Requiring Follow-Up  None    Pertinent Subjective and Physical Exam At Time of Discharge  Patient seen at bedside - doing well and no acute events overnight. Pain well-controlled on current regimen - requiring oxycodone for pain control (PDMP OARRS reviewed), lochia light, voiding spontaneously, passing flatus, ambulating independently, and tolerating PO.     Vitals:    25 0904   BP: 102/66   Pulse: 93   Resp: 16   Temp: 36.4 °C (97.5 °F)   SpO2: 97%       General: well appearing, well-nourished, postpartum  Obstetric: abdomen soft/non-tender, fundus firm below umbilicus, lochia light, umbilical incision c/d/i  Skin: No rashes/lesions/erythema  Neuro: A/Ox3, conversational  GI: +BS, +flatus  Respiratory: Even and unlabored on RA, LSCTA  BL  Cardiovascular: RRR, normal S1, S2  Extremities: No edema, discoloration, or pain in BLE, equal and palpable DP and PT pulses    Psych: appropriate mood and affect     Discharge Meds     Your medication list        START taking these medications        Instructions Last Dose Given Next Dose Due   acetaminophen 325 mg tablet  Commonly known as: Tylenol      Take 3 tablets (975 mg) by mouth every 6 hours if needed for mild pain (1 - 3) or moderate pain (4 - 6).       ibuprofen 600 mg tablet      Take 1 tablet (600 mg) by mouth every 6 hours if needed for mild pain (1 - 3) or moderate pain (4 - 6).       oxyCODONE 5 mg immediate release tablet  Commonly known as: Roxicodone      Take 1 tablet (5 mg) by mouth every 6 hours if needed for severe pain (7 - 10).              CONTINUE taking these medications        Instructions Last Dose Given Next Dose Due   prenatal no118-iron-folic acid 29 mg iron- 1 mg tablet,chewable      Chew 1 tablet once daily.              STOP taking these medications      aspirin 81 mg EC tablet                  Where to Get Your Medications        These medications were sent to [facility]  [address] [place] [address]      Hours: 8:30 AM to 5 PM Mon-Fri Phone: [phone]   acetaminophen 325 mg tablet  ibuprofen 600 mg tablet  oxyCODONE 5 mg immediate release tablet          Test Results Pending At Discharge  Pending Labs       Order Current Status    Surgical Pathology Exam - FALLOPIAN TUBE SALPINGECTOMY LEFT In process    Surgical Pathology Exam - FALLOPIAN TUBE SALPINGECTOMY RIGHT In process            Outpatient Follow-Up  No future appointments.  Patient instructed to call to schedule  2 weeks for incision check and 4-6 weeks for routine postpartum visit  with [facility].    I spent 20 minutes in the professional and overall care of this patient.      Brynn Lopez PA-C  05/29/25 10:08 AM  [place]

## 2025-05-29 NOTE — CARE PLAN
The patient's goals for the shift include Continue to bond with baby and prepare for D/C    The clinical goals for the shift include VSS    Mom does not want any vaccines at this time. VSS, assessments WNL. All education was competed with no questions at this time. Cleared for discharge per provider.     Problem: Fall/Injury  Goal: Not fall by end of shift  Outcome: Adequate for Discharge     Problem: Postpartum  Goal: Experiences normal postpartum course  Outcome: Adequate for Discharge  Goal: Appropriate maternal -  bonding  Outcome: Adequate for Discharge  Goal: No s/sx of hemorrhage  Outcome: Adequate for Discharge  Goal: Minimal s/sx of HDP and BP<160/110  Outcome: Adequate for Discharge

## 2025-05-29 NOTE — CARE PLAN
Over the shift pt maintained stable vital signs and was able to rest during shift.     Plan of care ongoing.       Problem: Postpartum  Goal: Experiences normal postpartum course  Outcome: Progressing  Goal: Appropriate maternal -  bonding  Outcome: Progressing  Goal: No s/sx of hemorrhage  Outcome: Progressing  Goal: Minimal s/sx of HDP and BP<160/110  Outcome: Progressing     Problem: Fall/Injury  Goal: Not fall by end of shift  Outcome: Progressing

## 2025-06-03 ENCOUNTER — APPOINTMENT (OUTPATIENT)
Dept: OBSTETRICS AND GYNECOLOGY | Facility: CLINIC | Age: 25
End: 2025-06-03
Payer: COMMERCIAL

## 2025-06-04 LAB
LABORATORY COMMENT REPORT: NORMAL
LABORATORY COMMENT REPORT: NORMAL
PATH REPORT.FINAL DX SPEC: NORMAL
PATH REPORT.FINAL DX SPEC: NORMAL
PATH REPORT.GROSS SPEC: NORMAL
PATH REPORT.GROSS SPEC: NORMAL
PATH REPORT.RELEVANT HX SPEC: NORMAL
PATH REPORT.RELEVANT HX SPEC: NORMAL
PATH REPORT.TOTAL CANCER: NORMAL
PATH REPORT.TOTAL CANCER: NORMAL

## 2025-06-10 ENCOUNTER — APPOINTMENT (OUTPATIENT)
Dept: OBSTETRICS AND GYNECOLOGY | Facility: CLINIC | Age: 25
End: 2025-06-10
Payer: COMMERCIAL

## 2025-06-20 ENCOUNTER — POSTPARTUM VISIT (OUTPATIENT)
Dept: OBSTETRICS AND GYNECOLOGY | Facility: CLINIC | Age: 25
End: 2025-06-20
Payer: COMMERCIAL

## 2025-06-20 VITALS
DIASTOLIC BLOOD PRESSURE: 67 MMHG | HEART RATE: 85 BPM | WEIGHT: 147.7 LBS | BODY MASS INDEX: 27.91 KG/M2 | SYSTOLIC BLOOD PRESSURE: 93 MMHG

## 2025-06-20 DIAGNOSIS — T78.40XA ALLERGY, INITIAL ENCOUNTER: ICD-10-CM

## 2025-06-20 PROCEDURE — 99213 OFFICE O/P EST LOW 20 MIN: CPT

## 2025-06-20 ASSESSMENT — ENCOUNTER SYMPTOMS
NEUROLOGICAL NEGATIVE: 0
GASTROINTESTINAL NEGATIVE: 0
EYES NEGATIVE: 0
HEMATOLOGIC/LYMPHATIC NEGATIVE: 0
PSYCHIATRIC NEGATIVE: 0
ENDOCRINE NEGATIVE: 0
RESPIRATORY NEGATIVE: 0
CONSTITUTIONAL NEGATIVE: 0
ALLERGIC/IMMUNOLOGIC NEGATIVE: 0
CARDIOVASCULAR NEGATIVE: 0
MUSCULOSKELETAL NEGATIVE: 0

## 2025-06-20 ASSESSMENT — PAIN SCALES - GENERAL: PAINLEVEL_OUTOF10: 0-NO PAIN

## 2025-06-20 NOTE — PROGRESS NOTES
Subjective   24 y.o.  presenting for postpartum follow-up     Delivery Date: 2025  Gestational Age: 37w6d   Type of Delivery: Vaginal, Spontaneous     Pregnancy Problems (from 10/31/24 to present)       Problem Noted Diagnosed Resolved    High-risk pregnancy in third trimester ([facility]) 3/4/2025 by Ana Martin MD  No    History of third degree perineal laceration 2024 by Ramona Bowser MD  No    Overview Signed 2024 11:02 AM by Ramona Bowser MD   In G1 pregnancy, 3629g infant  No residual deficits per her report  Desires          37 weeks gestation of pregnancy ([facility]) 10/31/2024 by Julianne Peters, APRN-CNM, APRN-CNP  No    Overview Addendum 2025 11:39 AM by Melvin Contreras MD   Desired provider in labor: [] CNM  [] Physician [x] Either  [x] Blood Products: [x] Yes, accepts [] No, needs counseling  [x] Initial BMI: 24.17   [x] Prenatal Labs: WNL 24   [x] Cervical Cancer Screening LSIL --> due for pap 2025  [x] Rh status: POS  [x] Genetic Screening:  cfDNA risk-reducing  [x] NT US: (11-13 wks)  WNL  [x] Baby ASA (if indicated): has already started  [x] Pregnancy dated by: 9w6d US    [x] Anatomy US: (19-20 wks) , 20w0d, normal anatomy, CL WNL, anterior placenta, no previa  [x] Federal Sterilization consent: signed 3/4  [x] 1hr GCT at 24-28wks: wnl  [x] Fetal Surveillance (if indicated): NA  [x] Tdap (27-32 wks): done 3/18  [x] Flu Vaccine: received at work    [x] Breastfeeding: breast pump Rx given  [x] Postpartum Birth control method: BTL, consents signed 3/4, IUD if unable  [x] GBS at 36 - 37 wks: neg  [x] 39 weeks discussion of IOL vs. Expectant management: 39wk IOL  [x] Mode of delivery (anticipated): vaginal         History of  premature rupture of membranes (PPROM) 10/31/2024 by MIRACLE Barraza-RICARDA, MIRACLE-CNP  No    Overview Addendum 3/4/2025  2:27 PM by Ana Martin MD    PPROM --> PTB at 35w  Suspect abruption in prior  pregnancy given report of continued bleeding throughout pregnancy  MFM consult -> CL screenings through 22w, all wnl         Asthma affecting pregnancy in second trimester ([facility]) 2024 by Ramona Stinson MD  No    Overview Addendum 3/4/2025  2:28 PM by Ana Martin MD   Diagnosed age 1  Triggers pollen, bleach, dander  No inhaler use since elementary school         Nausea and vomiting in pregnancy prior to 22 weeks gestation 2024 by Ramona Bowser MD  3/4/2025 by Ana Maritn MD    Overview Addendum 3/4/2025  2:27 PM by Ana Martin MD   Able to tolerate PO but still having nasuea/rare emesis with unisom and b6  States that symptoms have improved                 Concerns: patient has increased allergies.    Pain: controlled  Lacerations: none  Lochia: none  Sexual Intimacy: No  Contraceptive Method: Tubal Sterilization  Feeding Method: She is bottle feeding. no breast or nursing problems  Lactation Consult Needed?: No    Birth Trauma: No  Bonding with Baby: well with baby girl, [facility]    Mood:   Postpartum Depression: Low Risk  (2025)    [place]  Depression Scale     Last EPDS Total Score: 0     Last EPDS Self Harm Result: Never     Last pap: 10/31/24    Objective    BP 93/67   Pulse 85   Wt 67 kg (147 lb 11.2 oz)   LMP 2024 (Within Weeks)   Breastfeeding No   BMI 27.91 kg/m²    General: NAD, mood appropriate  Cardiopulmonary: warm and well perfused, breathing comfortably on room air  Abdomen: soft,  non-tender  Extremities: Symmetric   Pelvic Exam deferred       Plan    Advised to call office for breast complaints, abnormal bleeding, mood changes, or other concerning symptoms.   Cleared to resume normal activity as desired    Diagnoses and all orders for this visit:  Routine postpartum follow-up  Allergy, initial encounter  -     diphenhydrAMINE-acetaminophen (Tylenol PM)  mg per tablet; Take 1 tablet by mouth once daily.      Ramona Culver,  APRN-CNM